# Patient Record
Sex: MALE | Race: WHITE
[De-identification: names, ages, dates, MRNs, and addresses within clinical notes are randomized per-mention and may not be internally consistent; named-entity substitution may affect disease eponyms.]

---

## 2017-10-27 NOTE — RAD
TWO VIEWS CHEST:

 

10/27/2017

 

COMPARISON:

06/16/2017

 

FINDINGS:

Two views of the chest demonstrate bilateral pleural effusions and/or pleural scar.  No significant 
interval changes seen since the previous comparison exam from 06/16/2017.  No other evidence of

air space opacities seen.

 

IMPRESSION:

Stable views chest with bilateral pleural effusions or pleural scar.  No significant interval change
s noted.

 

POS: [facility]

## 2017-10-27 NOTE — XMS
Clinical Summary

 Created on:2017



Patient:Teddy Moseley

Sex:Male

:1935

External Reference #:[id number]





Demographics







 Address  [address] [place] [address]

 

 Home Phone  [phone]

 

 Preferred Language  Unknown

 

 Marital Status  Unknown

 

 Faith Affiliation  Unknown

 

 Race  Unknown

 

 Ethnic Group  Unknown









Author







 Organization  [facility]

 

 Address  [address] [place] [address]

 

 Phone  [phone]









Care Team Providers







 Name  Role  Phone

 

 ,  Primary Care Provider  Unavailable









Allergies

Not on File



Current Medications

Not on file



Active Problems

Not on file



Social History







 Tobacco Use  Types  Packs/Day  Years Used  Date

 

 Never Assessed        









 Sex Assigned at Birth  Date Recorded

 

 Not on file  







Last Filed Vital Signs

Not on file



Plan of Treatment

Not on file



Results

Not on filefrom Last 3 Months

## 2017-11-12 ENCOUNTER — HOSPITAL ENCOUNTER (INPATIENT)
Dept: HOSPITAL 92 - ERS | Age: 82
LOS: 5 days | Discharge: NURSING, REHAB OR LONG-TERM CARE | DRG: 469 | End: 2017-11-17
Attending: INTERNAL MEDICINE | Admitting: INTERNAL MEDICINE
Payer: MEDICARE

## 2017-11-12 VITALS — BODY MASS INDEX: 20.1 KG/M2

## 2017-11-12 DIAGNOSIS — G89.11: ICD-10-CM

## 2017-11-12 DIAGNOSIS — E78.5: ICD-10-CM

## 2017-11-12 DIAGNOSIS — N17.9: ICD-10-CM

## 2017-11-12 DIAGNOSIS — N40.1: ICD-10-CM

## 2017-11-12 DIAGNOSIS — I48.0: ICD-10-CM
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
## 2017-11-12 DIAGNOSIS — Z91.81: ICD-10-CM

## 2017-11-12 DIAGNOSIS — I25.2: ICD-10-CM

## 2017-11-12 DIAGNOSIS — J43.1: ICD-10-CM

## 2017-11-12 DIAGNOSIS — J90: ICD-10-CM

## 2017-11-12 DIAGNOSIS — R33.8: ICD-10-CM

## 2017-11-12 DIAGNOSIS — Z99.81: ICD-10-CM

## 2017-11-12 DIAGNOSIS — S42.031A: ICD-10-CM

## 2017-11-12 DIAGNOSIS — Z79.52: ICD-10-CM

## 2017-11-12 DIAGNOSIS — I10: ICD-10-CM

## 2017-11-12 DIAGNOSIS — G62.9: ICD-10-CM

## 2017-11-12 DIAGNOSIS — I25.10: ICD-10-CM

## 2017-11-12 DIAGNOSIS — I24.8: ICD-10-CM

## 2017-11-12 DIAGNOSIS — S72.011A: Primary | ICD-10-CM

## 2017-11-12 DIAGNOSIS — F17.220: ICD-10-CM

## 2017-11-12 DIAGNOSIS — I73.9: ICD-10-CM

## 2017-11-12 DIAGNOSIS — D50.0: ICD-10-CM

## 2017-11-12 DIAGNOSIS — J96.21: ICD-10-CM

## 2017-11-12 LAB
ALP SERPL-CCNC: 81 U/L (ref 40–150)
ALT SERPL W P-5'-P-CCNC: 29 U/L (ref 8–55)
ANION GAP SERPL CALC-SCNC: 12 MMOL/L (ref 10–20)
ANISOCYTOSIS BLD QL SMEAR: (no result) (100X)
APTT PPP: 25.7 SEC (ref 22.9–36.1)
AST SERPL-CCNC: 16 U/L (ref 5–34)
BILIRUB SERPL-MCNC: 1.4 MG/DL (ref 0.2–1.2)
BUN SERPL-MCNC: 21 MG/DL (ref 8.4–25.7)
CALCIUM SERPL-MCNC: 8.7 MG/DL (ref 7.8–10.44)
CHLORIDE SERPL-SCNC: 104 MMOL/L (ref 98–107)
CO2 SERPL-SCNC: 32 MMOL/L (ref 23–31)
CREAT CL PREDICTED SERPL C-G-VRATE: 0 ML/MIN (ref 70–130)
GLOBULIN SER CALC-MCNC: 2.6 G/DL (ref 2.4–3.5)
HCT VFR BLD CALC: 39.9 % (ref 42–52)
MACROCYTES BLD QL SMEAR: (no result) (100X)
MAGNESIUM SERPL-MCNC: 2.4 MG/DL (ref 1.6–2.6)
OVALOCYTES BLD QL SMEAR: (no result) (100X)
POLYCHROMASIA BLD QL SMEAR: (no result) (100X)
PROTHROMBIN TIME: 12.9 SEC (ref 12–14.7)
RBC # BLD AUTO: 3.9 MILL/UL (ref 4.7–6.1)
SCHISTOCYTES BLD QL SMEAR: (no result) (100X)
TROPONIN I SERPL DL<=0.01 NG/ML-MCNC: 0.02 NG/ML (ref ?–0.03)
TROPONIN I SERPL DL<=0.01 NG/ML-MCNC: 0.04 NG/ML (ref ?–0.03)
WBC # BLD AUTO: 32.9 THOU/UL (ref 4.8–10.8)

## 2017-11-12 PROCEDURE — 85730 THROMBOPLASTIN TIME PARTIAL: CPT

## 2017-11-12 PROCEDURE — 85007 BL SMEAR W/DIFF WBC COUNT: CPT

## 2017-11-12 PROCEDURE — 85610 PROTHROMBIN TIME: CPT

## 2017-11-12 PROCEDURE — 82553 CREATINE MB FRACTION: CPT

## 2017-11-12 PROCEDURE — 94003 VENT MGMT INPAT SUBQ DAY: CPT

## 2017-11-12 PROCEDURE — 80053 COMPREHEN METABOLIC PANEL: CPT

## 2017-11-12 PROCEDURE — 86900 BLOOD TYPING SEROLOGIC ABO: CPT

## 2017-11-12 PROCEDURE — 99406 BEHAV CHNG SMOKING 3-10 MIN: CPT

## 2017-11-12 PROCEDURE — 71010: CPT

## 2017-11-12 PROCEDURE — 86850 RBC ANTIBODY SCREEN: CPT

## 2017-11-12 PROCEDURE — 82805 BLOOD GASES W/O2 SATURATION: CPT

## 2017-11-12 PROCEDURE — 93005 ELECTROCARDIOGRAM TRACING: CPT

## 2017-11-12 PROCEDURE — 96375 TX/PRO/DX INJ NEW DRUG ADDON: CPT

## 2017-11-12 PROCEDURE — 94640 AIRWAY INHALATION TREATMENT: CPT

## 2017-11-12 PROCEDURE — A4216 STERILE WATER/SALINE, 10 ML: HCPCS

## 2017-11-12 PROCEDURE — 85027 COMPLETE CBC AUTOMATED: CPT

## 2017-11-12 PROCEDURE — P9016 RBC LEUKOCYTES REDUCED: HCPCS

## 2017-11-12 PROCEDURE — 36430 TRANSFUSION BLD/BLD COMPNT: CPT

## 2017-11-12 PROCEDURE — 84484 ASSAY OF TROPONIN QUANT: CPT

## 2017-11-12 PROCEDURE — 94760 N-INVAS EAR/PLS OXIMETRY 1: CPT

## 2017-11-12 PROCEDURE — G0390 TRAUMA RESPONS W/HOSP CRITI: HCPCS

## 2017-11-12 PROCEDURE — 36415 COLL VENOUS BLD VENIPUNCTURE: CPT

## 2017-11-12 PROCEDURE — 94002 VENT MGMT INPAT INIT DAY: CPT

## 2017-11-12 PROCEDURE — 83735 ASSAY OF MAGNESIUM: CPT

## 2017-11-12 PROCEDURE — S0028 INJECTION, FAMOTIDINE, 20 MG: HCPCS

## 2017-11-12 PROCEDURE — 96361 HYDRATE IV INFUSION ADD-ON: CPT

## 2017-11-12 PROCEDURE — 80048 BASIC METABOLIC PNL TOTAL CA: CPT

## 2017-11-12 PROCEDURE — 85025 COMPLETE CBC W/AUTO DIFF WBC: CPT

## 2017-11-12 PROCEDURE — 96365 THER/PROPH/DIAG IV INF INIT: CPT

## 2017-11-12 PROCEDURE — 84100 ASSAY OF PHOSPHORUS: CPT

## 2017-11-12 PROCEDURE — 86901 BLOOD TYPING SEROLOGIC RH(D): CPT

## 2017-11-12 RX ADMIN — MORPHINE SULFATE PRN MG: 4 INJECTION, SOLUTION INTRAMUSCULAR; INTRAVENOUS at 23:52

## 2017-11-12 NOTE — CON
DATE OF ADMISSION:  2017

 

DATE OF CONSULTATION:  2017

 

ATTENDING PHYSICIAN:  Dr. Lucas Flores.

 

HISTORY OF PRESENT ILLNESS:  Teddy Moseley is an 82-year-old gentleman with past medical history
 of COPD, atrial fibrillation, recurrent pleural effusion, who presented to [facility]
m status post fall.  He had a chief complaint of right lower extremity pain.  He was evaluated in [facility] emergency room and found to be in atrial fibrillation with RVR with a right clavicle fracture, a r
ight pleural effusion and a right hip fracture.  Given patient's severe pulmonary history and multip
le medical problems, Orthopedic Surgery asked that Medicine admit the patient with trauma and Orthop
edics as consultants.  Upon my evaluation, the patient reports a chief complaint of right hip pain. 
 GCS of 15.  The patient states that he was walking in his kitchen when his legs gave out from under
neath him.  He denied any dizziness, syncope, loss of consciousness, head trauma.  He states he land
ed on his right side, right hip, and right shoulder simultaneously.

 

ALLERGIES:  None.

 

HOME MEDICATIONS:  Ultram 50 mg 1 tab p.o. b.i.d. p.r.n. for pain, temazepam 30 mg p.o. at bedtime, 
gabapentin 600 mg t.i.d., prednisone 10 mg p.o. daily, although the patient is currently on a predni
sone taper, Flomax 0.4 mg p.o. daily, atorvastatin 40 mg p.o. at bedtime, and aspirin 81 mg p.o. tash
ly.

 

PAST MEDICAL HISTORY:  Significant for COPD on 2 liters home O2, atrial fibrillation, recurrent pleu
ral effusion, neuropathy, hypertension, BPH, coronary artery disease and peripheral vascular disease
.

 

PAST SURGICAL HISTORY:  Appendectomy, right lung decortication, carotid endarterectomy.

 

SOCIAL HISTORY:  The patient lives at home with wife and son.  Denies alcohol use.  Denies illicit d
rug use.  The patient is a former smoker, approximately 50-pack years, chews tobacco.

 

FAMILY HISTORY:  Significant for father  from a heart attack in his 70s.  Mother  fr
om cervical cancer in her 90s, brothers with diabetes.

 

REVIEW OF SYSTEMS:  Negative except as indicated in the HPI.

 

PHYSICAL EXAMINATION:

VITAL SIGNS:  Blood pressure 118/76, pulse 101, respirations 19, O2 sat 97% on 3-4 liters nasal arabella
timothy.

GENERAL:  Well-developed elderly appearing male, in no acute distress, resting in bed.

PULMONARY:  Normal work of breathing, symmetric chest rise.  Lung sounds are distant bilaterally wit
h mild expiratory wheezing.  There appears to be clubbing of the digits.

CARDIOVASCULAR:  Tachycardic, irregularly irregular.  No obvious murmurs, rubs or gallops.

GASTROINTESTINAL:  Abdomen is mildly rotund and tympanic.  No tenderness to palpation.  Bowel sounds
 are positive.

BACK:  Back exam is being reported within normal limits.

MUSCULOSKELETAL:  Right upper extremity range of motion limited secondary to pain.  There is tendern
ess to the right shoulder.  There is a skin tear on the right wrist that is currently bandaged.  Hamilton
ssing is clean, dry, and intact.  Right lower extremity mildly shortened and externally rotated.  Th
ere is left lower extremity range of motion within normal limits.  There is 1+ pitting edema at the 
ankle.  Pulses are intact bilaterally.

NEUROLOGIC:  GCS of 15.  No focal deficit noted.

 

LABORATORY FINDINGS:  WBC 32.9, hemoglobin 12.4, hematocrit 39.9, platelet count 252, 17% bands.  IN
R 1.0.  Sodium 144, potassium 4.4, chloride 104, carbon dioxide 32, BUN 21, creatinine 0.95, total b
ilirubin 1.4, AST and ALT within normal limits.

 

RADIOGRAPHIC FINDINGS:  X-ray of the right shoulder significant for clavicular fracture, official re
ad is pending.  Hip x-ray is significant for right femoral neck fracture, official read is pending. 
 Chest x-ray demonstrates hyperinflated lungs with bibasilar pleural effusion, right greater than le
ft, official read is still pending.  EKG is significant for atrial fibrillation with RVR and nonspec
ific T-wave abnormality as well as PVCs.

 

ASSESSMENT:

1.  Status post mechanical fall.

2.  Right hip fracture.

3.  Right clavicle fracture.

4.  Acute traumatic pain.

5.  Leukocytosis with bandemia.

6.  Anemia.

7.  Chronic obstructive pulmonary disease/chronic hypoxic respiratory failure on home O2.

8.  Bilateral effusion, right greater than left.

9.  History of coronary artery disease and hypertension.

10.  Chronic steroid use.

 

PLAN:  The patient to be admitted to Medicine Service and IMCU.  Orthopedic Surgery has seen and francia
luated the patient.  They plan for operative intervention tomorrow if cleared by Pulmonology and [facility]
pital Medicine.  Perioperative pain management.  PT, OT.  DVT and gastritis prophylaxis as appropria
te.  The patient should be n.p.o. after midnight.  Trauma attending has been notified of consultatio
n.  Plans for admission were discussed with the patient as the patient was being evaluated concurren
tly with Hospital Medicine.  All questions were answered at the time of this dictation.

## 2017-11-12 NOTE — RAD
TWO VIEWS RIGHT HIP:  

 

History: Fall, pain. 

 

Comparison: None. 

 

FINDINGS: 

An impacted slightly displaced fracture involving the right hip which appears to be subcapital in lo
cation. There is disruption of the cortical margin noted along the superior lateral aspect. 

 

Moderate degenerative changes of the right hip joint space. Visualized bony pelvis is unremarkable. 

 

IMPRESSION: 

Right hip (subcapital) fracture. 

 

POS: [facility]

## 2017-11-12 NOTE — RAD
THREE VIEWS RIGHT SHOULDER:

 

History: Fall, pain. 

 

Comparison: None. 

 

FINDINGS: 

Mild bone demineralization. Glenohumeral joint space is preserved. No fracture or dislocation with r
egards to the proximal humerus. 

 

Scapula is intact. Visualized right ribs are also intact. There is a mildly displaced distal clavicl
e fracture. 

 

IMPRESSION: 

Mildly displaced distal clavicle fracture. 

 

POS: [facility]

## 2017-11-12 NOTE — RAD
ONE VIEW CHEST:

 

Comparison: 10-27-17

 

History: Fall. Pain. 

 

FINDINGS: 

Chronic changes of the right hemithorax. Stable cardiac silhouette. Stable compensatory hyperinflati
on of the left lung. Stable bilateral apical pleural thickening. There is no pneumothorax. 

 

Distal right clavicle fracture. 

 

Acute fractures along the posterior right ribs are not appreciated. There appear to be remote fractu
res involving the posterior right 8th and 9th rib. 

 

IMPRESSION: 

1. Acute right clavicle fracture. 

2. Remote fractures along the posterior right 8th and 9th rib. 

3. Chronic changes of the lung parenchyma as above. 

 

POS: [facility]

## 2017-11-12 NOTE — CON
DATE OF CONSULTATION:  11/12/2017

 

PRINCIPAL DIAGNOSES:

1.  Femoral neck fracture.

2.  Clavicle fracture.

 

HISTORY OF PRESENT ILLNESS:  Mr. Moseley is an 82-year-old gentleman who suffers frequent falls at
 home.  He fell today and suffered a fracture of his distal clavicle and moderately displaced femora
l neck fracture on the right side, both fracture being on the right side.  He complains of pain at t
he hip and the shoulder.

 

PHYSICAL EXAMINATION:

GENERAL:  Shows a pleasant gentleman who is alert and oriented.  He has remarkable sense of humor an
d very good spirits.

HEAD:  Normocephalic, atraumatic.  His clavicle is tender.

EXTREMITIES:  His right hip has mild shortening and external rotation with tenderness with any manip
ulation of the hip.  No palpable pulses distally.  His capillary refill is intact, and he has sensat
ion and motor function distally on the right leg.  Neurovascular status is intact and right upper ex
tremity with intact radial pulse.

 

Radiographs show a type 2 distal clavicle fracture and a displaced femoral neck fracture.

 

PLAN:  At this time is as follows:  Clavicle fracture will be treated conservatively.  He may be all
owed to bear a little bit of weight on this since this is type 2 fracture.  The conoid and trapezoid
 ligaments appeared to be intact.  The femoral neck, we treated with hemiarthroplasty.  He does have
 a significant history of COPD and Dr. Barillas will see him preoperatively.  Spinal anesthesia will be
 considered.

## 2017-11-12 NOTE — HP
DATE OF ADMISSION:  11/12/2017

 

PRIMARY CARE PHYSICIAN:  Out at [facility].

 

PRIMARY CARDIOLOGIST:  Dr. Toro Orr at [facility].

 

PRIMARY PULMONOLOGIST AND BASICALLY PRIMARY CARE PHYSICIAN:  Dr. Mitch Barillas here.

 

CHIEF COMPLAINT:  Fall with hip fracture.

 

HISTORY OF PRESENT ILLNESS:  Mr. Moseley is a very pleasant 82-year-old white male with history of
 peripheral neuropathy, coronary artery disease with remote MI, hypertension, BPH, and COPD.  This p
atient is fairly significant who was in normal state of health and fell this morning.

 

He states he was walking, had been up for a couple of minutes, walking across the house, and suddenl
y just found himself on the floor.  He does not remember any loss of consciousness or presyncope.  N
o dizziness.  Normally, his family is around and they are able to help him to the floor, but not thi
s time.  He fell on to his left side, had immediate right hip pain and right shoulder pain.  He was 
brought to the emergency department for evaluation.

 

He denies any head trauma.  No fevers or chills.  No nausea or vomiting.  No diarrhea or constipatio
n.  He has recently been treated for a COPD exacerbation and is tapering down off the steroids.

 

No other current complaints.  At present, he is comfortable.

 

The patient was being seen by Dr. Hassan and by the Trauma Service by Corrine prior to my arrival.  T
he plan is for us to admit him for Orthopedics and Trauma to consult.

 

PAST MEDICAL HISTORY:

1.  Peripheral neuropathy.

2.  Coronary artery disease, status post MI in 2001.

3.  Benign prostatic hypertrophy.

4.  Hypertension.

5.  Asthma/COPD.

6.  Recurrent right pleural effusion that has not required drainage in some time.

 

PAST SURGICAL HISTORY:  Includes:

1.  Carotid endarterectomy.  He knows he has had the one done on the left and next he may have the o
ne done on the right as well.

2.  Appendectomy remotely.

3.  Right lung pleural effusion removal, multiple times.  At one time, he did have what sounds like 
a PleurX catheter in place, but that at some point fell out and has not needed re-draining since.

 

HOME MEDICATIONS:

1.  Aspirin 81 mg daily.

2.  Gabapentin 600 mg p.o. t.i.d.

3.  Tramadol 50 mg p.o. b.i.d.

4.  Tamsulosin 0.4 mg p.o. daily.

5.  Prednisone 10 mg p.o. daily.  He is currently on a tapering dose down to 20 mg daily.  He is sup
posed to go back down to 10 mg tomorrow.

6.  DuoNebs nebulized t.i.d.

7.  Budesonide 500 mg nebulized b.i.d.

8.  Temazepam 15-30 mg p.o. at bedtime.

9.  Vitamin D3 1000 units daily.

10.  Atorvastatin 40 mg p.o. at bedtime.

 

ALLERGIES:  NKDA.

 

FAMILY HISTORY:  Negative for clotting or bleeding disorder, no immune dysfunction.

 

SOCIAL HISTORY:  Significant for chewing tobacco.  He has backed off quite some time in the last cou
ple of weeks.  He did have a [id number] history, started at the age of 17 or 18, 1 pack per day fo
r about 60 years, but quit that a few years ago and started chewing tobacco.

 

No history of alcohol or IV drug use.  He lives with his family.

 

REVIEW OF SYSTEMS:  A 10-point review of systems was performed, negative for all other systems excep
t stated as per HPI.  Specifically, he states that his breathing is about baseline.  He denies any p
alpitations.

 

PHYSICAL EXAMINATION:

VITAL SIGNS:  Temperature currently 99.0, pulse anywhere from 107-149, blood pressure 148/79, respir
atory rate 20, satting 96% on 2 liters.

GENERAL:  He is awake.  He is alert.  He is oriented x3.  He is an older white male who is frail but
 appears to be well-developed, well-nourished.

HEENT:  Normocephalic, atraumatic.  Pupils equal, round, reactive bilaterally.  Mucous membranes are
 moist.  He has no visible lesion, no thrush.  His teeth are in poor repair.

NECK:  Supple.  He has bilateral carotid endarterectomy scars.  He has no bruits.  Normal carotid up
strokes.

NECK:  Supple with good range of motion.  No thyromegaly.

LUNGS:  He has prolonged expiratory phase and expiratory high-pitched wheezing.  He has got poor air
 movement bilaterally with symmetrical chest excursion.  He has no crackles, no rhonchi.

CARDIOVASCULAR:  He is irregularly irregular.  He has an S1 and a faint S2.  He has no audible murmu
rs.

ABDOMEN:  Soft.  It is nontender, nondistended, no masses, no organomegaly.

EXTREMITIES:  Show no cyanosis or clubbing.  He has no edema at present.

MUSCULOSKELETAL:  He has swelling and fluid around the medial right clavicle.  His left hip is nonte
nder and without any palpable joint effusions.  The remainder of his joints are normal to inspection
.

NEUROLOGIC:  Cranial nerves II through XII are grossly intact.  He has no focal neurologic deficits 
in 4-5/5 strength in all 4 of his extremities.

SKIN:  Otherwise, warm, moist, and well perfused.  He has no rashes, no lesions.  He has an IV in robby
th of his peripheral forearms.

 

LABORATORY DATA:  CMP is essentially normal.  Sodium 144, potassium 4.4, creatinine 0.95, glucose 89
.  INR is 1.0.

 

Troponin I was 0.022, MB fraction of 1.7.  He has a white count of 32,900,he has 75% granulocytes, w
ith 7% bands.  Hemoglobin is 12.4, hematocrit of 39.9, platelets 252,000.

 

IMAGING:  Right shoulder films showed a right clavicular fracture medial with minimal displacement. 
 Chest x-ray showed no acute lung abnormalities.  Right hip showed a comminuted right femoral neck f
racture.

 

ASSESSMENT AND PLAN:

1.  Fall from same level.  The patient does not report and specifically denies syncope, presyncope, 
or palpitations.  He does have a history of recurrent falls.  Placed him on fall precautions.

2.  Right femoral neck fracture:  Certainly, a traumatic fracture with associated right medial clavi
cular fracture.  Orthopedic has been consulted.  We will immobilize the right arm.  We will keep the
 patient n.p.o. as Dr. Hassan has seen the patient and plan is to take him to the operating room [facility] if cleared by Pulmonary for operative repair.

3.  Atrial fibrillation with rapid ventricular response, history of paroxysmal atrial fibrillation: 
 Per Dr. Gallegos's request, we will place the patient on amiodarone drip without a loading dose.  We
 are getting 1 mg per hour.  We will reevaluate in the morning.  The heart rate is fluctuating betwe
en 107-170 on the monitor, may benefit from the Cardizem as his blood pressure support or digoxin.

4.  History of coronary artery disease:  No chest pain.  He has a heart doctor that he sees in [place].  We will monitor his biomarkers.

5.  Benign prostatic hypertrophy.  We will continue his Flomax.

6.  Hypertension, on really nothing.

7.  Hyperlipidemia, on atorvastatin.

8.  History of chronic obstructive pulmonary disease:  Does not appear to be in any acute flare.  We
 will continue DuoNebs q.i.d. with q. 2 hours p.r.n. albuterol.  We will continue his steroids at 20
 mg a day.  I will likely restart his budesonide as well.  Pulmonary has been consulted.  We will ge
t their recommendations.

8.  Recurrent right pleural effusion:  Not much seen on chest x-ray today other than scarring and pl
eural thickening.  We will follow up on the official report once available.

## 2017-11-13 LAB
ANION GAP SERPL CALC-SCNC: 9 MMOL/L (ref 10–20)
BASOPHILS # BLD AUTO: 0 THOU/UL (ref 0–0.2)
BASOPHILS NFR BLD AUTO: 0.2 % (ref 0–1)
BUN SERPL-MCNC: 26 MG/DL (ref 8.4–25.7)
CALCIUM SERPL-MCNC: 7.8 MG/DL (ref 7.8–10.44)
CHLORIDE SERPL-SCNC: 105 MMOL/L (ref 98–107)
CO2 SERPL-SCNC: 31 MMOL/L (ref 23–31)
CREAT CL PREDICTED SERPL C-G-VRATE: 40 ML/MIN (ref 70–130)
EOSINOPHIL # BLD AUTO: 0.5 THOU/UL (ref 0–0.7)
EOSINOPHIL NFR BLD AUTO: 2.7 % (ref 0–10)
HCT VFR BLD CALC: 33.4 % (ref 42–52)
LYMPHOCYTES # BLD: 1.5 THOU/UL (ref 1.2–3.4)
LYMPHOCYTES NFR BLD AUTO: 8.5 % (ref 21–51)
MECHANICAL TIDAL VOLUME: 500 ML
MODIFIED ALLEN'S TEST: (no result)
MONOCYTES # BLD AUTO: 1.4 THOU/UL (ref 0.11–0.59)
MONOCYTES NFR BLD AUTO: 7.6 % (ref 0–10)
NEUTROPHILS # BLD AUTO: 14.8 THOU/UL (ref 1.4–6.5)
RBC # BLD AUTO: 3.24 MILL/UL (ref 4.7–6.1)
SODIUM SERPL-SCNC: 138 MMOL/L (ref 135–148)
TROPONIN I SERPL DL<=0.01 NG/ML-MCNC: 0.04 NG/ML (ref ?–0.03)
VENT: YES
WBC # BLD AUTO: 18.2 THOU/UL (ref 4.8–10.8)

## 2017-11-13 PROCEDURE — 5A1945Z RESPIRATORY VENTILATION, 24-96 CONSECUTIVE HOURS: ICD-10-PCS | Performed by: INTERNAL MEDICINE

## 2017-11-13 PROCEDURE — 0SRR0JA REPLACEMENT OF RIGHT HIP JOINT, FEMORAL SURFACE WITH SYNTHETIC SUBSTITUTE, UNCEMENTED, OPEN APPROACH: ICD-10-PCS | Performed by: ORTHOPAEDIC SURGERY

## 2017-11-13 RX ADMIN — FAMOTIDINE SCH MG: 10 INJECTION, SOLUTION INTRAVENOUS at 21:40

## 2017-11-13 RX ADMIN — AMIODARONE HYDROCHLORIDE SCH MLS: 50 INJECTION, SOLUTION INTRAVENOUS at 22:17

## 2017-11-13 RX ADMIN — CEFEPIME HYDROCHLORIDE SCH MLS: 1 INJECTION, POWDER, FOR SOLUTION INTRAMUSCULAR; INTRAVENOUS at 18:00

## 2017-11-13 RX ADMIN — FAMOTIDINE SCH MG: 10 INJECTION, SOLUTION INTRAVENOUS at 08:41

## 2017-11-13 RX ADMIN — Medication SCH GM: at 18:19

## 2017-11-13 RX ADMIN — AMIODARONE HYDROCHLORIDE SCH MLS: 50 INJECTION, SOLUTION INTRAVENOUS at 05:13

## 2017-11-13 RX ADMIN — Medication SCH GM: at 22:14

## 2017-11-13 RX ADMIN — MORPHINE SULFATE PRN MG: 4 INJECTION, SOLUTION INTRAMUSCULAR; INTRAVENOUS at 09:19

## 2017-11-13 RX ADMIN — MORPHINE SULFATE PRN MG: 4 INJECTION, SOLUTION INTRAMUSCULAR; INTRAVENOUS at 05:13

## 2017-11-13 NOTE — PRG
DATE OF SERVICE:  11/13/2017

 

Mr. Moseley is seen with the Trauma Service and Corrine Perez PA-C.  Please see her note for deta
ils:  Mr. Moseley has no complaints.  He is going to the OR today for open reduction and internal 
fixation of his hip fracture.

 

ASSESSMENT:   History of ground level fall and right hip fracture, set for repair today.  

 

He has a history of multiple chronic medical problems including chronic obstructive pulmonary diseas
e, history of atrial fibrillation, history of hypertension.

 

PLAN:  Appreciate Medicine's assistance with his care.  Plans per Ortho.  Trauma will continue to fo
llow.

## 2017-11-13 NOTE — PDOC.PN
- Subjective


Encounter Start Date: 11/13/17


Encounter Start Time: 09:00





Pt seen and exmained, wife at the bedisde.  Pt had had Afib/flutter with HR 

between 70 and 110 all morning.  No CP, no SOb, breathing at baeline.  NO f/C, 

no D/C.  trouble urinating today.  Did get flomax last evening





Case discussed face to face with Dr Barillas, Dr Hassan and Corrine ([facility]) with 

trauma.  Pt probably as medically stable as he gets, still high risk, but risk 

minimized





Troponins up a little overnight, likely demand ischemia form his 120-170 HR on 

admit





Hip hurts, cant move it due to pain





10 point ROS performed and neg for all systems except as per HPI





- Objective


Resuscitation Status: 


 











Resuscitation Status           DNR:Do Not Resuscitate














MAR Reviewed: Yes


Vital Signs & Weight: 


 Vital Signs (12 hours)











  Temp Pulse Resp BP BP Pulse Ox


 


 11/13/17 07:38  98.7 F  70  20    98


 


 11/13/17 07:31   70  20    98


 


 11/13/17 07:17  98.7 F  61  16  103/41 L   100


 


 11/13/17 05:00    14   103/47 L  100


 


 11/13/17 04:00   78  14   94/48 L  100


 


 11/13/17 03:00   80  14   90/40 L  100


 


 11/13/17 02:30   84  14   97/59 L  100


 


 11/13/17 02:00   82  14   97/39 L  100


 


 11/13/17 01:30   84  16   100/54 L 


 


 11/13/17 01:00   105 H  16   104/46 L  100


 


 11/13/17 00:32   61  18    98


 


 11/13/17 00:30   82  16   107/50 L  100


 


 11/13/17 00:00   84  16   113/63  100


 


 11/12/17 23:30   82  16   107/60  100








 Weight











Weight                         157 lb














I&O: 


 











 11/12/17 11/13/17 11/14/17





 06:59 06:59 06:59


 


Intake Total  1121 


 


Balance  1121 











Result Diagrams: 


 11/13/17 03:44





 11/13/17 03:44


Radiology Reviewed by me: Yes


EKG Reviewed by me: Yes





Phys Exam





- Physical Examination


Constitutional: NAD


HEENT: PERRLA, moist MMs, sclera anicteric, oral pharynx no lesions


Neck: no nodes, no JVD, supple, full ROM


Respiratory: no wheezing, no rhonchi


coarse R sided breath sounds.  poor air movemment


no prolonged expiration or wheezing this AM


irreg irreg.  no audible murmurs at present


Gastrointestinal: soft, non-tender, no distention, positive bowel sounds


Musculoskeletal: no edema, pulses present


Neurological: non-focal, normal sensation, moves all 4 limbs


decreased RLE movement due ot pain.  foot flexion normal


Lymphatic: no nodes


Psychiatric: normal affect, A&O x 3


Skin: no rash, normal turgor, cap refill <2 seconds


Deviation from normal: hematoma over medial right clavicle stable





Dx/Plan


(1) Fall on same level as cause of accidental injury


Code(s): W18.30XA - FALL ON SAME LEVEL, UNSPECIFIED, INITIAL ENCOUNTER   Status

: Acute   





(2) Fracture of femoral neck, right, closed


Code(s): S72.001A - FRACTURE OF UNSP PART OF NECK OF RIGHT FEMUR, INIT   Status

: Acute   Comment: to OR today 11/13 for ORIF with Dr Hassan.  Medically stable

   





(3) Right clavicle fracture


Code(s): S42.001A - FRACTURE OF UNSP PART OF RIGHT CLAVICLE, INIT FOR CLOS FX   

Status: Acute   


Qualifiers: 


   Encounter type: initial encounter   Clavicle location: sternal end   

Fracture type: closed   Fracture alignment: nondisplaced   Qualified Code(s): 

S42.017A - Nondisplaced fracture of sternal end of right clavicle, initial 

encounter for closed fracture   


Comment: minimal displacement   





(4) COPD (chronic obstructive pulmonary disease)


Status: Acute   


Qualifiers: 


   COPD type: emphysema   Emphysema type: panlobular   Qualified Code(s): J43.1 

- Panlobular emphysema   


Comment: on scheduled duonebs, oral steroids, PRN albuterol.  Pulm on the case, 

discusse dith Dr Barillas his [facility] Lung specialist   





(5) Paroxysmal atrial fibrillation with RVR


Code(s): I48.0 - PAROXYSMAL ATRIAL FIBRILLATION   Status: Chronic   Comment: in 

Afib/flutter on the monitor.  Rate better controlled.  On amio gtt.  continue 

for now.  Looks like he may be tryin gto organize and convert.  Not an 

anticoagulation candidate due to falls   





(6) Urinary retention


Code(s): R33.9 - RETENTION OF URINE, UNSPECIFIED   Status: Acute   Comment: 

acute, history of BPH. bladder scan and starks is more than 300   





(7) GERTRUDE (acute kidney injury)


Code(s): N17.9 - ACUTE KIDNEY FAILURE, UNSPECIFIED   Status: Acute   Comment: 

Dr Osborn to 1.49.  ? ATN from decreased output with RVR vs lower obstruction.  

follow   





(8) CAD (coronary artery disease)


Code(s): I25.10 - ATHSCL HEART DISEASE OF NATIVE CORONARY ARTERY W/O ANG PCTRS 

  Status: Acute   


Qualifiers: 


   Coronary Disease-Associated Artery/Lesion type: native artery   Native vs. 

transplanted heart: native heart   Associated angina: without angina   

Qualified Code(s): I25.10 - Atherosclerotic heart disease of native coronary 

artery without angina pectoris   





(9) BPH with obstruction/lower urinary tract symptoms


Code(s): N40.1 - BENIGN PROSTATIC HYPERPLASIA WITH LOWER URINARY TRACT SYMP; 

N13.8 - OTHER OBSTRUCTIVE AND REFLUX UROPATHY   Status: Chronic   Comment: 

continue flomax, starks to be placed perioperatively   





(10) HTN (hypertension)


Code(s): I10 - ESSENTIAL (PRIMARY) HYPERTENSION   Status: Chronic   


Qualifiers: 


   Hypertension type: essential hypertension   Qualified Code(s): I10 - 

Essential (primary) hypertension   





(11) Peripheral neuropathy


Code(s): G62.9 - POLYNEUROPATHY, UNSPECIFIED   Status: Chronic   


Qualifiers: 


   Peripheral neuropathy type: polyneuropathy, unspecified   Qualified Code(s): 

G62.9 - Polyneuropathy, unspecified   





(12) Demand ischemia


Code(s): I24.8 - OTHER FORMS OF ACUTE ISCHEMIC HEART DISEASE   Status: Acute   

Comment: barely elevated troponin, no CP.  form increased HR with RVR.  rate 

better controlled   





- Plan





* .

## 2017-11-13 NOTE — OP
DATE OF PROCEDURE:  11/13/2017

 

PREOPERATIVE DIAGNOSIS:  Right hip displaced femoral neck fracture.

 

POSTOPERATIVE DIAGNOSIS:  Right hip displaced femoral neck fracture.

 

OPERATIVE PROCEDURE:  Press-Fit right hip monopolar hemiarthroplasty.

 

SURGEON:  Robert Hassan M.D.

 

ASSISTANT:  Brett Martini PA-C.

 

ANESTHESIA:  General via endotracheal tube.

 

COMPONENTS USED:  [employer] Accolade press fit size 3.5 with a 54 mm Uni_____ prosthesis w
ith -4 neck length.

 

ESTIMATED BLOOD LOSS:  150 mL

 

FINDINGS:  Femoral neck fracture as described on radiographs.

 

DRAINS:  None.

 

SPECIMENS:  None.

 

COMPLICATIONS:  None.

 

COUNTS:  Correct.

 

INDICATIONS FOR SURGERY:  Teddy is an 82-year-old white male who fell yesterday resulting in right
 hip femoral neck fracture.  He has been admitted to the medicine service and our service was consul
nicki for definitive orthopedic management of his right hip.

 

PROCEDURE IN DETAIL:  After informed consent was obtained in the preoperative holding area, the maame
ent received preoperative antibiotics.  He was taken to the operative suite and general anesthesia w
as induced and an endotracheal tube was placed and secured.  Once adequate anesthesia was obtained h
e was positioned appropriately  on the operating table in the left lateral decubitus.  The right low
er extremity was then prepped and draped in usual sterile fashion.  After the _____ was prepared, ti
me out was called prior to incision and all members of the surgical team agreed on the site, surgeon
, and patient.  Once this was completed, a linear incision was made using an anterolateral approach 
over the right greater trochanter.  Subcutaneous layers were dissected with Bovie electrocautery.  W
e encountered a large hematoma.  This was evacuated.  IT band was then dissected out and incised sha
rply.  Charnley retractor was placed under the IT band.  The abductors were then incised with Bovie 
electrocautery and reflected down to the neck.  Copious _____ was performed revealing a large hemato
ma and fracture pattern.  Provisional neck cut was made using the oscillating saw.  We then removed 
the femoral head with the corkscrew and the remaining debris was rongeured out.  Ensuring that the a
cetabulum was clear, pulsatile lavage was carried out in the depth of the acetabulum.  Attention was
 then turned to prep femoral preparation.  Reaming was carried up to size 5 and we are sequential br
oaching up to size 3.5.  This held firm and fast with antirotation and no subsidence.  The permanent
 prosthesis was then malleted firmly and squarely into place.  The endoprosthesis was trialed with memo neff shuck and stability.  Therefore we elected to go ahead and Egan taper the endoprosthesis into p
lace.  Reduction maneuver was performed.  Primary closure was accomplished with #2 and the abductors
 #2 and the IT band with a running Quill #2 and 0 Monocryl in the subcutaneous layer and Quill stitc
h was used to reapproximate the skin and skin glue was then used over the top.  A sterile dressing w
as applied.  The procedure was terminated without any complication.  The patient was sent to the Int
ensive Care Unit with an endotracheal tube in place and secured where he will be weaned off the vent
ilator.

## 2017-11-13 NOTE — CON
DATE OF CONSULTATION:  11/13/2017

 

HISTORY:  This is an 82-year-old gentleman who sees Dr. Barillas regularly.  His wife was here at the [facility].  He apparently fell and fractured his right hip, he was taken to surgery by Dr. Hassan, he h
as left on the vent overnight.  His chest x-ray shows chronic right pleural scarring, thickening.  JOSEPH ladd had previous thoracentesis and chest tube on the same side.

 

He has longstanding history of COPD.

 

Apparently he got weak this morning _____ on the floor.

 

Extensive history as outlined in several different records is pertinent for his prolonged chest tube
 following decortication in the right side.

 

PAST MEDICAL HISTORY:  Coronary artery disease, BPH, hypertension, COPD, neuropathy, deconditioning.

 

PAST SURGICAL HISTORY:  Carotid endarterectomy, appendix, decortication.

 

MEDICATIONS:  From home includes a recent prednisone, tramadol, temazepam, Crestor, Protonix, DuoNeb
, gabapentin, folic acid, Coreg, _____, aspirin, and Tylenol.

 

ALLERGIES:  None.

 

REVIEW OF SYSTEMS:  Negative.

 

PHYSICAL EXAMINATION:

VITAL SIGNS:  Blood pressure 110/56, pulse 89, temperature 99.

CHEST:  Reveals decreased breath sounds without any wheezing.

CARDIAC:  Normal S1, S2.  No gallops.

ABDOMEN:  Soft.  No masses.

 

LABORATORY:  White count 18,000, H\T\H 10 and 30, platelet 178.  White count on admission was 32,000
.  His chemistry showed creatinine of 1.46.

 

X-ray is noted.

 

IMPRESSION:

1.  Status post fall, fractured hip, status post surgery.

2.  Severe chronic obstructive pulmonary disease.

3.  Supraventricular tachycardia.

4.  Neuropathy.

 

PLAN:  Continue vent support overnight.  Continue IV Medrol, wean when stable.

 

Pain relief.

 

Dr. Barillas will see the patient in the morning.

## 2017-11-13 NOTE — PRG
DATE OF SERVICE:  11/13/2017

 

SUBJECTIVE:  Teddy Moseley is an 82-year-old gentleman who is hospital day 2, postop day 0, stat
us post ground level fall.  He was found to have a right hip fracture.  He is scheduled for operativ
e intervention to his injury later this afternoon pending pulmonary and medical clearance.  This a.m
., patient has a chief complaint of right hip pain and requested to see the x-rays of his hip fractu
re.

 

Atrial fibrillation is now rate controlled.

 

OBJECTIVE:

VITAL SIGNS:  Temperature 98.7, pulse 70, respirations 20, O2 sat 98% on 2 liters, blood pressure 12
2/58.

GENERAL:  Elderly thin and somewhat frail appearing male resting in bed in no acute distress.  Basel
ine tremor noted.

Pulmonary:  Normal work of breathing.  Symmetrical rise.

CARDIOVASCULAR:  Irregularly irregular.  No obvious murmurs, rubs or gallops.

GASTROINTESTINAL:  Abdomen is soft, nontender, and nondistended.

MUSCULOSKELETAL:  Limited right lower extremity and right upper extremity range of motion secondary 
to pain.

SKIN:  There is a hematoma over his right clavicle.

NEUROLOGIC:  No focal deficit noted.

 

LABORATORY DATA:  WBC 18.2, hemoglobin 10.6, hematocrit 33.4, and platelet count 178.  Sodium 141, p
otassium 4.2, chloride 105, carbon dioxide 31, BUN 26, creatinine 1.46, glucose 89.  Troponin 0.042.

 

ASSESSMENT:

1.  Status post ground level fall.

2.  Right hip fracture.

3.  Right clavicle fracture.

4.  Acute traumatic pain.

5.  Elevated troponin.

6.  Chronic obstructive pulmonary disease, chronic hypoxic respiratory failure on home O2.

7.  Urinary retention.

8.  Acute kidney injury.

9.  History of benign prostatic hypertrophy.

10.  History of hypertension.

11.  Atrial fibrillation, rate controlled.

 

PLAN:  The patient is awaiting clearance from Pulmonary for surgery this afternoon for his right hip
 fracture.  Patient should have sling for comfort to right upper extremity.  The patient is currentl
y n.p.o.  We will evaluate postoperative and manage postoperative pain.  Case management for eventua
l disposition.  Follow Pulmonary and Medicine recommendations regarding comorbidities.  Patient is c
urrently attempting to avoid RN to a bladder scan and place a Sandoval if residual or if bladder scan i
s greater than 300 per hospital medicine  A.m. labs.  Patient seen by trauma attending.  All questio
ns are answered at the time of this dictation.

## 2017-11-13 NOTE — RAD
RIGHT HIP TWO VIEWS:

 

Indication: Post op evaluation of the right hip.  

 

FINDINGS: 

Right hip prosthesis without evidence of hardware complication. Expected post procedural soft tissue
 findings are seen. 

 

IMPRESSION: 

Post-operative right hip. 

 

POS: RENATO

## 2017-11-14 LAB
ACANTHOCYTES BLD QL SMEAR: (no result) (100X)
ANION GAP SERPL CALC-SCNC: 11 MMOL/L (ref 10–20)
ANISOCYTOSIS BLD QL SMEAR: (no result) (100X)
BUN SERPL-MCNC: 38 MG/DL (ref 8.4–25.7)
CALCIUM SERPL-MCNC: 8 MG/DL (ref 7.8–10.44)
CHLORIDE SERPL-SCNC: 105 MMOL/L (ref 98–107)
CO2 SERPL-SCNC: 26 MMOL/L (ref 23–31)
CREAT CL PREDICTED SERPL C-G-VRATE: 40 ML/MIN (ref 70–130)
HCT VFR BLD CALC: 28.5 % (ref 42–52)
MAGNESIUM SERPL-MCNC: 2 MG/DL (ref 1.6–2.6)
RBC # BLD AUTO: 2.69 MILL/UL (ref 4.7–6.1)
SCHISTOCYTES BLD QL SMEAR: (no result) (100X)
WBC # BLD AUTO: 20.7 THOU/UL (ref 4.8–10.8)

## 2017-11-14 RX ADMIN — CEFEPIME HYDROCHLORIDE SCH MLS: 1 INJECTION, POWDER, FOR SOLUTION INTRAMUSCULAR; INTRAVENOUS at 15:33

## 2017-11-14 RX ADMIN — AMIODARONE HYDROCHLORIDE SCH MLS: 50 INJECTION, SOLUTION INTRAVENOUS at 14:29

## 2017-11-14 RX ADMIN — Medication SCH GM: at 06:07

## 2017-11-14 RX ADMIN — CEFEPIME HYDROCHLORIDE SCH MLS: 1 INJECTION, POWDER, FOR SOLUTION INTRAMUSCULAR; INTRAVENOUS at 04:18

## 2017-11-14 RX ADMIN — ACETAMINOPHEN AND CODEINE PHOSPHATE PRN TAB: 300; 30 TABLET ORAL at 20:30

## 2017-11-14 NOTE — PRG
DATE OF SERVICE:  11/14/2017

 

SUBJECTIVE:  The patient's hospital day #3, postop day #1, status post ground level fall when he nina
tained a right hip fracture.  The patient had multiple comorbidities to include significant COPD, ch
ronic hypoxic respiratory failure on home O2.  The patient underwent his orthopedic procedure yester
day and tolerated this procedure well.  The patient was left on the ventilator overnight.  This morn
ing will be evaluated by Dr. Barillas, his primary pulmonologist to evaluate potential for extubation. 
 Per report, the patient had no issues overnight.  The patient's vital signs were stable.

 

OBJECTIVE:

VITAL SIGNS:  This morning temperature is 99.1, heart rate 100, blood pressure 177/65, respirations 
20, oxygen saturation is 100% on the ventilator at 40% oxygen.

GENERAL:  The patient is resting in bed.  He is on sedation vacation this morning.  He will open his
 eyes and will follow some simple commands.

LUNGS:  His chest has scattered wheezing and rhonchi bilaterally.

HEART:  Irregularly irregular.

ABDOMEN:  Soft, flat, nontender with hypoactive bowel sounds.  Pelvis is stable.

EXTREMITIES:  Neurovascularly intact x4.  His surgical dressing is clean, dry, and intact.

 

LABORATORY DATA:  White blood cell count 20.7, hemoglobin 8.6, hematocrit 28.5, platelet 162.  Sodiu
m 138, potassium 4.4, chloride 105, CO2 of 26, BUN 38, creatinine 1.47, glucose 187, magnesium 2.0, 
phosphorus 3.9.

 

ASSESSMENT AND PLAN:

1.  Status post ground level fall.

2.  Right hip fracture, status post open reduction and internal fixation of hip fracture, right clav
icle fracture treated with sling.

3.  Respiratory failure secondary to significant chronic obstructive pulmonary disease, which is jae
ng followed by Dr. Barillas.  Plan will be to continue supportive care.  The patient's traumatic injuri
es have been addressed, and he is primarily going to have potential problems with his medical issues
, at which time we will allow the medicine team and specifically Dr. Barillas manage his care.  We may 
be consulted as needed.  Orthopedics will continue to follow along per their norm postoperatively.  
This was discussed with Dr. Barillas, and he was in agreement with this plan.

## 2017-11-14 NOTE — RAD
SEMIUPRIGHT CHEST 1 VIEW:

 

Date:  11/14/17 

 

HISTORY:  

82-year-old male with respiratory insufficiency. 

 

COMPARISON:  

11/12/17. 

 

FINDINGS:

Endotracheal tube in satisfactory location. Monitor leads overlie the chest. Prominent right-sided p
leural opacity with minimal parenchymal opacity and some fairly prominent right-sided lung volume lo
ss. Left apical pleural thickening. Healed right rib fractures. Loop recorder overlying the left rose
st. 

 

IMPRESSION: 

Stable right-sided lung volume loss with extensive pleural and some parenchymal opacity changes. Sta
ble left chest. Endotracheal tube in satisfactory location. 

 

 

POS: RENATO

## 2017-11-14 NOTE — PDOC.PN
- Subjective


Encounter Start Date: 11/14/17


Encounter Start Time: 16:45


Subjective: f/u s/p ORIF R hip fx post fall and extubated. No new complaints.


-: Tele showing A-fib controlled rate on Amiodarone gtt. No CP or 


-: worsening SOB.





- Objective


Resuscitation Status: 


 











Resuscitation Status           DNR:Do Not Resuscitate














MAR Reviewed: Yes


Vital Signs & Weight: 


 Vital Signs (12 hours)











  Temp Pulse Resp BP Pulse Ox


 


 11/14/17 16:00  98.2 F    


 


 11/14/17 13:10   85  15   99


 


 11/14/17 12:00  97.7 F     93 L


 


 11/14/17 09:36   94  23 H   99


 


 11/14/17 08:00    15  


 


 11/14/17 07:24  99.1 F  99  21 H   93 L


 


 11/14/17 07:00  99.1 F    


 


 11/14/17 06:58   97   134/46 L 


 


 11/14/17 06:52   101 H  22 H   99


 


 11/14/17 06:00    17  








 Weight











Admit Weight                   157 lb


 


Weight                         157 lb











 Most Recent Monitor Data











Heart Rate from ECG            75


 


NIBP                           143/60


 


NIBP BP-Mean                   95


 


Respiration from ECG           14


 


SpO2                           97














I&O: 


 











 11/13/17 11/14/17 11/15/17





 06:59 06:59 06:59


 


Intake Total 1121 252.8 240


 


Output Total  1050 370


 


Balance 1121 -797.2 -130











Result Diagrams: 


 11/14/17 04:53





 11/14/17 04:53


Additional Labs: 





 Laboratory Tests











  11/12/17 11/12/17 11/13/17





  18:28 18:28 03:44


 


WBC  32.9 H  


 


Hgb  12.4 L  


 


Creatinine   0.95  1.46 H














  11/13/17





  03:44


 


WBC  18.2 H


 


Hgb  10.6 L


 


Creatinine 











Radiology Reviewed by me: Yes (PCXR - chronic changes bilat, R distal clavicle 

fx)


EKG Reviewed by me: Yes (Tele - A-fib in 80's on Amiodarone gtt)





Phys Exam





- Physical Examination


Constitutional: NAD


HEENT: PERRLA, oral pharynx no lesions


Neck: no JVD, supple


diminished in bases


Cardiovascular: irregular


Gastrointestinal: soft, non-tender, no distention, positive bowel sounds


R hip with edema and surgical dressing in place


Musculoskeletal: pulses present


Neurological: normal sensation, moves all 4 limbs


Psychiatric: A&O x 3


Skin: normal turgor, cap refill <2 seconds





Dx/Plan


(1) Fracture of femoral neck, right, closed


Code(s): S72.001A - FRACTURE OF UNSP PART OF NECK OF RIGHT FEMUR, INIT   Status

: Acute   Comment: to OR today 11/13 for ORIF with Dr Hassan.  Medically stable

   





(2) GERTRUDE (acute kidney injury)


Code(s): N17.9 - ACUTE KIDNEY FAILURE, UNSPECIFIED   Status: Acute   Comment: 

Dr Osborn to 1.49.  ? ATN from decreased output with RVR, supportive care, avoid 

nephrotoxic meds and contrast media, repeat creat in am   





(3) CAD (coronary artery disease)


Code(s): I25.10 - ATHSCL HEART DISEASE OF NATIVE CORONARY ARTERY W/O ANG PCTRS 

  Status: Chronic   


Qualifiers: 


   Coronary Disease-Associated Artery/Lesion type: native artery   Native vs. 

transplanted heart: native heart   Associated angina: without angina   

Qualified Code(s): I25.10 - Atherosclerotic heart disease of native coronary 

artery without angina pectoris   


Comment: Stable,    





(4) COPD (chronic obstructive pulmonary disease)


Status: Acute   


Qualifiers: 


   COPD type: emphysema   Emphysema type: panlobular   Qualified Code(s): J43.1 

- Panlobular emphysema   


Comment: on scheduled duonebs, oral steroids, PRN albuterol.  Pulm on the case, 

[facility], continue Cefepime, 

Solumedrol, Duonebs   





(5) Demand ischemia


Code(s): I24.8 - OTHER FORMS OF ACUTE ISCHEMIC HEART DISEASE   Status: Acute   

Comment: barely elevated troponin, no CP.  form increased HR with RVR.  rate 

better controlled   





(6) Right clavicle fracture


Code(s): S42.001A - FRACTURE OF UNSP PART OF RIGHT CLAVICLE, INIT FOR CLOS FX   

Status: Acute   


Qualifiers: 


   Encounter type: initial encounter   Clavicle location: sternal end   

Fracture type: closed   Fracture alignment: nondisplaced   Qualified Code(s): 

S42.017A - Nondisplaced fracture of sternal end of right clavicle, initial 

encounter for closed fracture   


Comment: minimal displacement, shoulder sling   





(7) Paroxysmal atrial fibrillation with RVR


Code(s): I48.0 - PAROXYSMAL ATRIAL FIBRILLATION   Status: Chronic   Comment: in 

Afib/flutter on the monitor.  Rate better controlled.  On amio gtt.  continue 

for now.  Looks like he may be tryin gto organize and convert.  Not an 

anticoagulation candidate due to falls   





- Plan


plan discussed w/ family, continue antibiotics, PT/OT, , 

respiratory therapy, DVT proph w/SCDs


Stable currently


-: Continue bronchodilators


-: Continue Solumedrol and Cefepime


-: PT/OT for mobilization


-: Continue Amiodarone gtt





* Pain control


* AM lab: BMP, CBC


* Likely transition to tele in 24h

## 2017-11-14 NOTE — PRG
DATE OF SERVICE:  11/14/2017

 

SUBJECTIVE:  Mr. Teddy Moseley was left intubated overnight.  He is awake and alert this morning
.

 

OBJECTIVE:

VITAL SIGNS:  His heart rate is 85, respiratory rate is 15, oximetry is 99% and blood pressure 137/5
0.

LUNGS:  Equal breath sounds.  His lungs are clear.

HEART:  Regular rhythm.

ABDOMEN:  Soft.

 

LABORATORY DATA:  White count 20, hemoglobin 8.6 and platelets 162,000.  Electrolytes were normal.  
Creatinine is 1.47.  His baseline on admission was 0.95.  PH 7.42, CO2 of 30 and pO2 of 116 yesterda
y after surgery.

 

IMPRESSION:

1.  Chronic obstructive pulmonary disease with overnight ventilation after surgery.

2.  Status post right hip hemiarthroplasty after a fall.

3.  Underlying obstructive lung disease.

4.  History of clavicle fracture with this fall.

5.  History of an indwelling chest tube for a chronic empyema, eventually fell out after he had slow
ly been advanced out.  He has had no recurrence of symptoms in this area.

6.  Chronic respiratory failure, chronically on oxygen at home.

 

PLAN:  Continue supportive care.  I felt he is a candidate for extubation.  This has been done succe
ssfully.  He has no complaints.  He is back to his baseline entertaining personality.

## 2017-11-15 LAB
ANION GAP SERPL CALC-SCNC: 10 MMOL/L (ref 10–20)
BUN SERPL-MCNC: 54 MG/DL (ref 8.4–25.7)
CALCIUM SERPL-MCNC: 8.3 MG/DL (ref 7.8–10.44)
CHLORIDE SERPL-SCNC: 103 MMOL/L (ref 98–107)
CO2 SERPL-SCNC: 28 MMOL/L (ref 23–31)
CREAT CL PREDICTED SERPL C-G-VRATE: 35 ML/MIN (ref 70–130)
HCT VFR BLD CALC: 22.2 % (ref 42–52)
RBC # BLD AUTO: 2.19 MILL/UL (ref 4.7–6.1)
WBC # BLD AUTO: 19.7 THOU/UL (ref 4.8–10.8)

## 2017-11-15 PROCEDURE — 30233N1 TRANSFUSION OF NONAUTOLOGOUS RED BLOOD CELLS INTO PERIPHERAL VEIN, PERCUTANEOUS APPROACH: ICD-10-PCS | Performed by: INTERNAL MEDICINE

## 2017-11-15 RX ADMIN — ACETAMINOPHEN AND CODEINE PHOSPHATE PRN TAB: 300; 30 TABLET ORAL at 17:08

## 2017-11-15 RX ADMIN — CEFEPIME HYDROCHLORIDE SCH MLS: 1 INJECTION, POWDER, FOR SOLUTION INTRAMUSCULAR; INTRAVENOUS at 03:19

## 2017-11-15 RX ADMIN — ACETAMINOPHEN AND CODEINE PHOSPHATE PRN TAB: 300; 30 TABLET ORAL at 08:27

## 2017-11-15 RX ADMIN — ACETAMINOPHEN AND CODEINE PHOSPHATE PRN TAB: 300; 30 TABLET ORAL at 03:16

## 2017-11-15 RX ADMIN — CEFEPIME HYDROCHLORIDE SCH MLS: 1 INJECTION, POWDER, FOR SOLUTION INTRAMUSCULAR; INTRAVENOUS at 16:09

## 2017-11-15 RX ADMIN — AMIODARONE HYDROCHLORIDE SCH MLS: 50 INJECTION, SOLUTION INTRAVENOUS at 05:13

## 2017-11-15 RX ADMIN — AMIODARONE HYDROCHLORIDE SCH MLS: 50 INJECTION, SOLUTION INTRAVENOUS at 19:34

## 2017-11-15 NOTE — CON
DATE OF CONSULTATION:  11/15/2017

 

REASON FOR CONSULTATION:  Atrial fibrillation.

 

REFERRING PROVIDER:  Mitch Barillas M.D.

 

HISTORY OF PRESENT ILLNESS:  Mr. Moseley is a pleasant 82-year-old gentleman, who has been seen and
 evaluated by Cardiology in [place], [place].  He recently presented with a fractured hip and clavicl
e.  He underwent successful surgery on 11/13/2017.  He developed atrial fibrillation in addition to a
nemia.  He has no current symptoms.  No chest pain, pressure, shortness of breath or other associated
 symptoms.

 

PAST MEDICAL HISTORY:  MI 25 years ago, CAD, BPH, hypertension, and COPD.

 

PAST SURGICAL HISTORY:  Carotid endarterectomy, decortication of his lung.

 

HOME MEDICATIONS:  Include Crestor, Protonix, DuoNeb, gabapentin, folic acid, Coreg, aspirin, and Tyl
enol.

 

ALLERGIES:  None.

 

REVIEW OF SYSTEMS:  Ten-point review of systems is reviewed and is as above negative.

 

PHYSICAL EXAMINATION:

GENERAL:  Patient is a pleasant male who is in no acute distress.  The patient appears his stated age
.

VITAL SIGNS:  Blood pressure 110/70, pulse 80, respiratory rate 20, and heart rate 72.

NEUROLOGIC:  The patient is alert and oriented times 3 with no focal neurologic deficits.

HEENT:  Sclerae without icterus.  Mouth has moist mucous membranes with normal pallor.

NECK:  No JVD.  Carotid upstroke brisk.  No bruits bilaterally.

LUNGS:  Clear to auscultation with unlabored respirations.

BACK:  No scoliosis or kyphosis.

CARDIAC:  Regular rate and rhythm with extra systolic beats.

ABDOMEN:  Soft, nontender, nondistended.  No peritoneal signs present.  No hepatosplenomegaly.  No ab
normal striae.

EXTREMITIES:  2+ femoral and 2+ dorsalis pedis pulses.  No cyanosis, clubbing, or edema.

SKIN:  No gross abnormalities.

 

LABORATORY DATA:  Hemoglobin 7.1.  Creatinine 1.66.

 

IMPRESSION:

1.  Atrial fibrillation with rapid ventricular response, now sinus with PAC.

2.  Coronary artery disease.

3.  Anemia.

4.  Recent hip fracture.

 

RECOMMENDATIONS:  Unknown whether atrial fibrillation is due to a recent demise with a hip fracture a
nemia versus intermittent paroxysmal atrial fibrillation.  At this point, he continues to be in sinus
 rhythm.  We will continue amiodarone drip at this point.  He does have a history of CAD and may be a
 candidate for Multaq versus amiodarone therapy.  The other option includes a 3-week event recorder a
nd reassess in 1 month.  Echo with Doppler also recommended.  Otherwise, I have no recommendations.

## 2017-11-15 NOTE — PDOC.PN
- Subjective


Encounter Start Date: 11/15/17


Encounter Start Time: 13:15


Subjective: f/u for R hip fx s/p ORIF POD #1. A-fib RVR on Amiodarone now rate


-: controlled. Receiving 2u PRBC's currently. No new complaints.





- Objective


Resuscitation Status: 


 











Resuscitation Status           DNR:Do Not Resuscitate














MAR Reviewed: Yes


Vital Signs & Weight: 


 Vital Signs (12 hours)











  Temp Pulse Resp Pulse Ox


 


 11/15/17 13:18   72  24 H  98


 


 11/15/17 12:00  98.5 F   


 


 11/15/17 07:22  97.7 F  65  17  97


 


 11/15/17 07:04     99


 


 11/15/17 07:01   63  26 H  99


 


 11/15/17 07:00  97.7 F   


 


 11/15/17 04:00  98.1 F   


 


 11/15/17 03:16   79  16  96








 Weight











Admit Weight                   157 lb


 


Weight                         157 lb











 Most Recent Monitor Data











Heart Rate from ECG            69


 


NIBP                           120/49


 


NIBP BP-Mean                   74


 


Respiration from ECG           15


 


SpO2                           100














I&O: 


 











 11/14/17 11/15/17 11/16/17





 06:59 06:59 06:59


 


Intake Total 252.8 2615 590


 


Output Total 1050 775 260


 


Balance -797.2 1840 330











Result Diagrams: 


 11/15/17 04:22





 11/15/17 04:22


Additional Labs: 





 Laboratory Tests











  11/12/17 11/12/17 11/13/17





  18:28 18:28 03:44


 


WBC  32.9 H  


 


Hgb  12.4 L  


 


Creatinine   0.95  1.46 H














  11/13/17 11/14/17 11/14/17





  03:44 04:53 04:53


 


WBC  18.2 H   20.7 H


 


Hgb  10.6 L   8.6 L


 


Creatinine   1.47 H 











Radiology Reviewed by me: Yes (PCXR - no acute infiltrates)


EKG Reviewed by me: Yes (Tele - A-fib in 70's)





Phys Exam





- Physical Examination


Constitutional: NAD


HEENT: PERRLA, oral pharynx no lesions


Neck: no JVD, supple


wheezing noted


Cardiovascular: irregular


Gastrointestinal: soft, non-tender, no distention, positive bowel sounds


R hip with edema and surgical dressing in place


Musculoskeletal: pulses present


Neurological: normal sensation, moves all 4 limbs


Psychiatric: A&O x 3


Skin: normal turgor, cap refill <2 seconds





Dx/Plan


(1) Fracture of femoral neck, right, closed


Code(s): S72.001A - FRACTURE OF UNSP PART OF NECK OF RIGHT FEMUR, INIT   Status

: Acute   Comment: s/p R hip ORIF POD#1, pain control, DVT ppx, rehab screening

   





(2) GERTRUDE (acute kidney injury)


Code(s): N17.9 - ACUTE KIDNEY FAILURE, UNSPECIFIED   Status: Acute   Comment: 

Persistent, IVF's, avoid nephrotoxic meds, limit NSAIDs   





(3) CAD (coronary artery disease)


Code(s): I25.10 - ATHSCL HEART DISEASE OF NATIVE CORONARY ARTERY W/O ANG PCTRS 

  Status: Chronic   


Qualifiers: 


   Coronary Disease-Associated Artery/Lesion type: native artery   Native vs. 

transplanted heart: native heart   Associated angina: without angina   

Qualified Code(s): I25.10 - Atherosclerotic heart disease of native coronary 

artery without angina pectoris   


Comment: Stable,    





(4) COPD (chronic obstructive pulmonary disease)


Status: Acute   


Qualifiers: 


   COPD type: emphysema   Emphysema type: panlobular   Qualified Code(s): J43.1 

- Panlobular emphysema   


Comment: on scheduled duonebs, oral steroids, PRN albuterol.  Pulm on the case, 

discusse dith Dr Barillas his [facility] Lung specialist, continue Cefepime, 

Solumedrol, Duonebs   





(5) Demand ischemia


Code(s): I24.8 - OTHER FORMS OF ACUTE ISCHEMIC HEART DISEASE   Status: Acute   

Comment: barely elevated troponin, no CP.  form increased HR with RVR.  rate 

better controlled   





(6) Right clavicle fracture


Code(s): S42.001A - FRACTURE OF UNSP PART OF RIGHT CLAVICLE, INIT FOR CLOS FX   

Status: Acute   


Qualifiers: 


   Encounter type: initial encounter   Clavicle location: sternal end   

Fracture type: closed   Fracture alignment: nondisplaced   Qualified Code(s): 

S42.017A - Nondisplaced fracture of sternal end of right clavicle, initial 

encounter for closed fracture   


Comment: minimal displacement, shoulder sling   





(7) Paroxysmal atrial fibrillation with RVR


Code(s): I48.0 - PAROXYSMAL ATRIAL FIBRILLATION   Status: Chronic   Comment: in 

Afib/flutter on the monitor.  Rate better controlled.  On amio gtt.  continue 

for now, Cardiology consult, no anticoagulation due to falls and acute anemia   





- Plan


PT/OT, , respiratory therapy, out of bed/ambulate, DVT proph w/

SCDs


Stable overall


-: Continue Amiodarone gtt for rate control


-: Transfuse 2u PRBC's today


-: Rehab screen


-: Transfer to Tele





* AM labs: BMP, CBC

## 2017-11-15 NOTE — RAD
PORTABLE CHEST:

 

History: Respiratory distress. 

 

Comparison: Prior day's exam. 

 

FINDINGS: 

Endotracheal tube has been removed. Heart size is within normal limits. Pleural and parenchymal lung 
changes are stable. 

 

IMPRESSION: 

Interval removal of the endotracheal tube, otherwise stable exam. 

 

POS: [facility]

## 2017-11-15 NOTE — PRG
DATE OF SERVICE:  11/15/2017

 

SUBJECTIVE:  Mr. Moseley is doing reasonably well.  He has no complaints surprisingly of his clavic
le fracture.  He said he could not shoot a deer rifle, but other than that he is doing fine.

 

OBJECTIVE:

VITAL SIGNS:  He is afebrile.  Heart rate 64, respiratory rate is 20, oximetry is 98%  on 2 liters an
d blood pressure 125/57.

LUNGS:  Free of wheezes.

HEART:  Regular rhythm.

ABDOMEN:  Soft and nontender.

EXTREMITIES:  His feet are warm.

 

IMPRESSION:

1.  Status post surgical repair.

2.  Chronic atrial fibrillation.

3.  Chronic obstructive pulmonary disease.

4.  History of an empyema with a chronic indwelling chest tube.

 

PLAN:  Continue with therapy, wound, respiratory therapy.  Transfuse 2 units of packed cells today.  
Given his multiple medical problems, I do not think he will tolerate ongoing significant anemia.

## 2017-11-16 LAB
ANION GAP SERPL CALC-SCNC: 9 MMOL/L (ref 10–20)
BUN SERPL-MCNC: 44 MG/DL (ref 8.4–25.7)
CALCIUM SERPL-MCNC: 8.4 MG/DL (ref 7.8–10.44)
CHLORIDE SERPL-SCNC: 104 MMOL/L (ref 98–107)
CO2 SERPL-SCNC: 30 MMOL/L (ref 23–31)
CREAT CL PREDICTED SERPL C-G-VRATE: 53 ML/MIN (ref 70–130)
HCT VFR BLD CALC: 26.9 % (ref 42–52)
RBC # BLD AUTO: 2.77 MILL/UL (ref 4.7–6.1)
WBC # BLD AUTO: 15.6 THOU/UL (ref 4.8–10.8)

## 2017-11-16 RX ADMIN — AMIODARONE HYDROCHLORIDE SCH MLS: 50 INJECTION, SOLUTION INTRAVENOUS at 11:57

## 2017-11-16 RX ADMIN — ACETAMINOPHEN AND CODEINE PHOSPHATE PRN TAB: 300; 30 TABLET ORAL at 09:16

## 2017-11-16 RX ADMIN — ACETAMINOPHEN AND CODEINE PHOSPHATE PRN TAB: 300; 30 TABLET ORAL at 01:28

## 2017-11-16 RX ADMIN — CEFEPIME HYDROCHLORIDE SCH MLS: 1 INJECTION, POWDER, FOR SOLUTION INTRAMUSCULAR; INTRAVENOUS at 15:48

## 2017-11-16 RX ADMIN — ACETAMINOPHEN AND CODEINE PHOSPHATE PRN TAB: 300; 30 TABLET ORAL at 20:16

## 2017-11-16 RX ADMIN — CEFEPIME HYDROCHLORIDE SCH MLS: 1 INJECTION, POWDER, FOR SOLUTION INTRAMUSCULAR; INTRAVENOUS at 03:47

## 2017-11-16 NOTE — PRG
DATE OF SERVICE:  11/16/2017

 

Mr. Moseley is still on IV amiodarone.  

 

PHYSICAL EXAMINATION: 

VITAL SIGNS:  His heart rate is 62, respiratory rate is 19, oximetry is 96 on 2 liters, blood pressur
e 131/51.  

LUNGS:  Lungs are clear.  

HEART:  Regular rhythm.

ABDOMEN:  Abdomen is soft.  

EXTREMITIES:  His feet are warm.

 

LABORATORY:  White count is 15.6, hemoglobin 8.6, platelets 150,000.  Sodium 138, potassium 4.8, chlo
ride 104, bicarb 30, BUN 44, creatinine 1.11.

 

IMPRESSION:

1.  Status post multiple fractures after a fall.

2.  Blood loss anemia.  His hemoglobin was 8.6 today.

3.  Prerenal azotemia related to his blood loss, that has improved.

4.  Chronic obstructive pulmonary disease with chronic respiratory failure on oxygen.

5.  Atrial fibrillation which has been followed by a cardiologist in [place].  

 

PLAN:  He is stable to move out of the Critical Care Unit if Cardiology agrees.  Rehab placement will
 be the next step.

## 2017-11-16 NOTE — PDOC.PN
- Subjective


Encounter Start Date: 11/16/17


Encounter Start Time: 13:40


Subjective: f/u for A-fib RVR on previous Amiodarone now d/c'd. Feels better 

and 


-: breathing better. s/p 2u PRBC's with stable H/H. Minimal R hip pain and


-: working with PT for ambulation.





- Objective


Resuscitation Status: 


 











Resuscitation Status           DNR:Do Not Resuscitate














MAR Reviewed: Yes


Vital Signs & Weight: 


 Vital Signs (12 hours)











  Temp Pulse Resp Pulse Ox


 


 11/16/17 13:42   64  15  96


 


 11/16/17 12:00  97.9 F   


 


 11/16/17 08:00  97.8 F  57 L  19  97


 


 11/16/17 07:40   62  19  96


 


 11/16/17 04:00  98.2 F   








 Weight











Admit Weight                   157 lb


 


Weight                         157 lb











 Most Recent Monitor Data











Heart Rate from ECG            67


 


NIBP                           153/59


 


NIBP BP-Mean                   97


 


Respiration from ECG           23


 


SpO2                           94














I&O: 


 











 11/15/17 11/16/17 11/17/17





 06:59 06:59 06:59


 


Intake Total 2615 1881 350


 


Output Total 775 1600 440


 


Balance 1840 281 -90











Result Diagrams: 


 11/16/17 03:55





 11/16/17 03:55


Additional Labs: 





 Laboratory Tests











  11/12/17 11/12/17 11/13/17





  18:28 18:28 03:44


 


WBC  32.9 H  


 


Hgb  12.4 L  


 


Creatinine   0.95  1.46 H














  11/13/17 11/14/17 11/14/17





  03:44 04:53 04:53


 


WBC  18.2 H   20.7 H


 


Hgb  10.6 L   8.6 L


 


Creatinine   1.47 H 














  11/15/17 11/15/17





  04:22 04:22


 


WBC   19.7 H


 


Hgb   7.1 L


 


Creatinine  1.66 H 











Radiology Reviewed by me: Yes (PCXR - no acute findings)


EKG Reviewed by me: Yes (Tele - SR in 60's)





Phys Exam





- Physical Examination


Constitutional: NAD


alert, talkative


HEENT: PERRLA, oral pharynx no lesions


Neck: no JVD, supple


Respiratory: no wheezing, clear to auscultation bilateral


Cardiovascular: RRR


Gastrointestinal: soft, non-tender, no distention, positive bowel sounds


R hip with surgical dressing in place, + edema


Musculoskeletal: pulses present


Neurological: normal sensation, moves all 4 limbs


Psychiatric: A&O x 3


Skin: normal turgor, cap refill <2 seconds





Dx/Plan


(1) Fracture of femoral neck, right, closed


Code(s): S72.001A - FRACTURE OF UNSP PART OF NECK OF RIGHT FEMUR, INIT   Status

: Acute   Comment: s/p R hip ORIF POD#2, pain control, DVT ppx, rehab transfer 

when medicall stabilized   





(2) GERTRUDE (acute kidney injury)


Code(s): N17.9 - ACUTE KIDNEY FAILURE, UNSPECIFIED   Status: Acute   Comment: 

Resolved, avoid nephrotoxic meds, limit NSAIDs   





(3) CAD (coronary artery disease)


Code(s): I25.10 - ATHSCL HEART DISEASE OF NATIVE CORONARY ARTERY W/O ANG PCTRS 

  Status: Chronic   


Qualifiers: 


   Coronary Disease-Associated Artery/Lesion type: native artery   Native vs. 

transplanted heart: native heart   Associated angina: without angina   

Qualified Code(s): I25.10 - Atherosclerotic heart disease of native coronary 

artery without angina pectoris   


Comment: Stable,    





(4) COPD (chronic obstructive pulmonary disease)


Status: Acute   


Qualifiers: 


   COPD type: emphysema   Emphysema type: panlobular   Qualified Code(s): J43.1 

- Panlobular emphysema   


Comment: on scheduled duonebs, oral steroids, PRN albuterol.  Pulm on the case, 

discus[facility]ith [facility], continue Cefepime, 

Solumedrol, Duonebs   





(5) Demand ischemia


Code(s): I24.8 - OTHER FORMS OF ACUTE ISCHEMIC HEART DISEASE   Status: Acute   

Comment: barely elevated troponin, no CP.  form increased HR with RVR.  rate 

better controlled   





(6) Right clavicle fracture


Code(s): S42.001A - FRACTURE OF UNSP PART OF RIGHT CLAVICLE, INIT FOR CLOS FX   

Status: Acute   


Qualifiers: 


   Encounter type: initial encounter   Clavicle location: sternal end   

Fracture type: closed   Fracture alignment: nondisplaced   Qualified Code(s): 

S42.017A - Nondisplaced fracture of sternal end of right clavicle, initial 

encounter for closed fracture   


Comment: minimal displacement, shoulder sling prn, ROM exercises as tolerated   





(7) Paroxysmal atrial fibrillation with RVR


Code(s): I48.0 - PAROXYSMAL ATRIAL FIBRILLATION   Status: Chronic   Comment: 

Resolved, Amiodarone po, no anticoagulation due to falls and acute anemia   





- Plan


plan discussed w/ family, PT/OT, , respiratory therapy, out of 

bed/ambulate, DVT proph w/SCDs


Stable overall


-: Continue rate control with Amiodarone po


-: Follow H/H levels


-: Rehab transfer when medically optimized


-: Continue Coreg 3.125mg BID





* AM lab: BMP, CBC


* Transfer to telemetry unit

## 2017-11-16 NOTE — RAD
PORTABLE SEMIUPRIGHT FRONTAL CHEST RADIOGRAPH:

 

DATE: 11/16/17.

 

COMPARISON: 

11/15/17.

 

HISTORY: 

Ventilated patient.

 

FINDINGS: 

No endotracheal tube is present.  Extensive linear interstitial density noted with pulmonary hyperinf
lation, stable.  Loop recorder overlies the left hilar shadow, stable.

 

There is volume loss involving the right hemithorax with circumferential right pleural thickening, st
able.  Blunting of costophrenic angle again noted on the right.  There is an obliquely oriented mildl
y displaced distal right clavicle fracture.

 

IMPRESSION: 

Stable chronic findings as detailed above.

 

POS: [facility]

## 2017-11-16 NOTE — PRG
DATE OF SERVICE:  11/16/2017

 

SUBJECTIVE:  Mr. Moseley is doing better.  He is back in sinus rhythm with PACs.  He is currently o
n IV amiodarone.

 

PHYSICAL EXAMINATION: 

VITAL SIGNS:  Blood pressure 130/51, pulse 67, temperature 98.8.

LUNGS:  Clear to auscultation.

CARDIAC:  Regular rate and rhythm with extra systolic beats.

ABDOMEN:  Soft, nontender, nondistended.

EXTREMITIES:  No edema.

 

IMPRESSION:

1.  Atrial fibrillation, now sinus rhythm.

2.  Recent hip fracture.

3.  Coronary artery disease, status post myocardial infarction.

 

RECOMMENDATIONS:  It may be prudent to continue to watch Mr. Moseley on telemetry monitoring to ass
ess for any further episodes of atrial fibrillation.  This may have been precipitated by recent traum
a.  Will likely recommend close outpatient monitoring.  He has an implantable loop recorder which we 
can follow to assess for paroxysmal atrial fibrillation.  It would be okay from my standpoint to lim
sfer to telemetry.

## 2017-11-17 VITALS — DIASTOLIC BLOOD PRESSURE: 81 MMHG | TEMPERATURE: 97.9 F | SYSTOLIC BLOOD PRESSURE: 179 MMHG

## 2017-11-17 LAB
ANION GAP SERPL CALC-SCNC: 8 MMOL/L (ref 10–20)
ANISOCYTOSIS BLD QL SMEAR: (no result) (100X)
BUN SERPL-MCNC: 39 MG/DL (ref 8.4–25.7)
CALCIUM SERPL-MCNC: 8.1 MG/DL (ref 7.8–10.44)
CHLORIDE SERPL-SCNC: 105 MMOL/L (ref 98–107)
CO2 SERPL-SCNC: 30 MMOL/L (ref 23–31)
CREAT CL PREDICTED SERPL C-G-VRATE: 57 ML/MIN (ref 70–130)
HCT VFR BLD CALC: 25.7 % (ref 42–52)
MACROCYTES BLD QL SMEAR: (no result) (100X)
RBC # BLD AUTO: 2.64 MILL/UL (ref 4.7–6.1)
SCHISTOCYTES BLD QL SMEAR: (no result) (100X)
WBC # BLD AUTO: 12.8 THOU/UL (ref 4.8–10.8)

## 2017-11-17 PROCEDURE — 30233N1 TRANSFUSION OF NONAUTOLOGOUS RED BLOOD CELLS INTO PERIPHERAL VEIN, PERCUTANEOUS APPROACH: ICD-10-PCS | Performed by: INTERNAL MEDICINE

## 2017-11-17 RX ADMIN — ACETAMINOPHEN AND CODEINE PHOSPHATE PRN TAB: 300; 30 TABLET ORAL at 02:17

## 2017-11-17 RX ADMIN — ACETAMINOPHEN AND CODEINE PHOSPHATE PRN TAB: 300; 30 TABLET ORAL at 08:33

## 2017-11-17 RX ADMIN — ACETAMINOPHEN AND CODEINE PHOSPHATE PRN TAB: 300; 30 TABLET ORAL at 14:00

## 2017-11-17 RX ADMIN — CEFEPIME HYDROCHLORIDE SCH MLS: 1 INJECTION, POWDER, FOR SOLUTION INTRAMUSCULAR; INTRAVENOUS at 04:52

## 2017-11-17 NOTE — DIS
DATE OF ADMISSION:  11/12/2017

 

DATE OF DISCHARGE:  11/17/2017

 

DISCHARGE DIAGNOSES:

1.  Fall from same level as cause of accidental injury.

2.  Right displaced femoral neck fracture.

3.  Paroxysmal atrial fibrillation.

4.  Atrial fibrillation with rapid ventricular response.

5.  Chronic obstructive pulmonary disease with acute exacerbation.

6.  Acute on chronic hypoxemic respiratory failure.

7.  Right traumatic clavicular fracture, distal with minimal angulation and displacement.

8.  Benign prostatic hypertrophy.

9.  Urinary retention.

10.  Acute kidney injury.

11.  History of coronary artery disease without angina.

12.  Essential hypertension.

13.  Peripheral neuropathy.

14.  Demand ischemia.

 

CONSULTATIONS:

1.  Cardiology, Dr. Haque on 11/15/2017.

2.  Pulmonary and Critical Care, Dr. Crow per Dr. Barillas.

3.  Orthopedics, Dr. Robert Hassan.

4.  Trauma team by Dr. Corrine Perez.

 

PROCEDURES:  On 11/13/2017, monopolar hemiarthroplasty by Dr. Robert Hassan with assistant, Brett Martini
.

 

HISTORY AND PHYSICAL:  Mr. Moseley is a pleasant 82-year-old gentleman whom I admitted back on 11/1
2/2017 and has presented in the Emergency Department for a fall and hip pain.

 

Evaluation showed atrial fibrillation with rapid ventricular response with heart rate in the 140s to 
170s, right hip fracture, and the right clavicular fracture.  We were called for admission, Trauma wa
s consulted as well as orthopedic consultation was placed by the emergency room.

 

HOSPITAL COURSE:  The patient was seen by me in the emergency department.  He was admitted to the [facility] due to atrial fibrillation with RVR.  He was started on amiodarone per Dr. Gallegos's request, with 
improvement in his heart rate.  He had no chest pain.  Cardiac biomarkers are mildly elevated due to 
demand from increased heart rate and COPD was stable.  Continue on nebulizer treatments and started o
n IV steroids.  Continue Flomax for urinary retention.

 

Overnight on 11/12/2017 to 11/13/2017, the patient was retaining urine.  Sandoval catheter was placed in
 anticipation of surgery anyways and medically heart rate was under better control.  Labs remained ot
herwise stable.  He was taken to the operating room on 11/13/2017 by Dr. Hassan for hemiarthroplasty.
  Patient tolerated the procedure well with no known intraoperative complications.  The patient was s
een postoperatively in consultation by Pulmonary and Critical Care for his stay in the intermediate c
are unit, he was continued on vent support overnight on 11/13/2017 to 11/14/2017 continued on IV Medr
ol.

 

By 11/14/2017, the patient was taken to OR by Dr. Rubio for our service.  The patient has remained sta
ble.  Creatinine remained slightly elevated from his baseline, remained afebrile.  Cardiology was con
sulted and saw the patient on 11/15/2017.  Overnight on 11/14/2017 to 11/15/2017, patient remained st
able, but still in atrial fibrillation.  He was seen by Dr. Haque in 11/15/2017, but the patient 
had converted to sinus rhythm.  He is to continue on amiodarone and converted to p.o.  Recommended 2D
 echocardiogram which does not look like it has been ordered.

 

By 11/16/2017, the patient remained stable.  Creatinine down to 1.11.  Hemoglobin down to 8.6 and 
D remained stable.  He has been down onto the critical care unit to the floor and on overnight on 11/
16/2017 and 11/17/2017 remained without event.

 

Today, his hemoglobin was slightly down to 8.4.  Given his recent fibrillation with RVR, the patient 
was given 1 final unit of packed red blood cells.  He tolerated the transfusion well and post-procedu
re, was accepted and stable for transfer to inpatient rehabilitation for rehabilitation.

 

DISCHARGE PHYSICAL EXAMINATION:  The patient was seen and examined on the day of discharge.  Discharg
e plan and disposition were discussed with the patient face to face at the bedside.

 

DISCHARGE MEDICATIONS:

1.  Amiodarone 200 mg p.o. b.i.d., new medication.

2.  Tylenol No. 3 one to two p.o. q.6 hours p.r.n. moderate to severe pain.

3.  Albuterol 2.5 mg nebulizer every 2 hours as needed for shortness of breath.

4.  DuoNeb scheduled q.6 hours.

5.  Aspirin 81 mg daily.

6.  Lipitor 40 mg daily.

7.  Carvedilol 3.125 mg p.o. b.i.d.

8.  Iron sulfate 325 mg p.o. b.i.d.

9.  Folic acid 1 mg daily.

10.  Gabapentin 600 mg b.i.d.

11.  Protonix 40 mg p.o. q.a.m.

12.  Prednisone 10 mg daily.

13.  Flomax 0.4 mg p.o. at bedtime.

14.  Temazepam 50 mg p.o. at bedtime p.r.n. insomnia.

15.  Tramadol on hold.

 

FOLLOWUP APPOINTMENTS:

1.  Cardiology within a week.

2.  Primary care physician within a week.

3.  Dr. Barillas in 1-2 weeks.

4.  Follow up with his cardiologist as necessary.

 

DISCHARGE DIET:  Heart healthy.

 

DISCHARGE ACTIVITY:  Per cardiopulmonary and orthopedic limitations.

 

DISCHARGE THERAPIES:  Physical therapy and occupational therapy while at inpatient rehabilitation.

 

DISCHARGE CONDITION:  Stable.

 

DISPOSITION:  Being discharged to [facility] inpatient rehabilitation.

 

INSTRUCTIONS:  Return to Emergency Department for chest pain, difficulty breathing or new problems.

## 2017-11-17 NOTE — PDOC.CTH
Cardiology Progress Note





- Subjective





No new issues overnight. Remains in sinus rhythm with PACs. No CV complaints. 

Tolerating current medical therapy. ROS otherwise negative.





- Objective


 Vital Signs











  Temp Pulse Pulse Resp BP BP Pulse Ox


 


 11/17/17 11:21  98.3 F   60  18  160/74 H  


 


 11/17/17 11:06  98.2 F   60  16  158/70 H   94 L


 


 11/17/17 08:28        100


 


 11/17/17 08:26   68   16   


 


 11/17/17 07:54  98.7 F  53 L   16   133/63  99


 


 11/17/17 04:00  97.5 F L  90   20   115/59 L  98


 


 11/17/17 00:14   64   12   








 











Admit Weight                   157 lb


 


Weight                         165 lb 8 oz














 











 11/16/17 11/17/17 11/18/17





 06:59 06:59 06:59


 


Intake Total 1881 850 0


 


Output Total 1600 890 


 


Balance 281 -40 0














- Physical Examination


General/Neuro: alert & oriented x3, NAD


Neck: carotid US brisk, no JVD present


Lungs: CTA, unlabored respirations


Heart: PMI normal, RRR


Abdomen: no HSM, NT/ND, soft


Extremities: other: (2+ pulses, no edema)


Other PE findings: Neuro: no focal motor defs





- Telemetry


Telemetry Rhythm: sinus with PACs





- Labs


Result Diagrams: 


 11/17/17 04:36





 11/17/17 04:36


 Troponin/CKMB











CK-MB (CK-2)  1.7 ng/mL (0-6.6)   11/12/17  18:28    


 


Troponin I  0.042 ng/mL (< 0.028)  H  11/13/17  00:16    














- Assessment/Plan





1. PAF: sinus on amiodarone. Continue oral therapy. Follow up with Dr. Haque in week following discharge.





2. Hip fracture: stable.





3. COPD: stable currently.

## 2017-11-17 NOTE — PRG
DATE OF SERVICE:  11/17/2017

 

SUBJECTIVE:  Teddy Moseley has done well overnight.  He has no complaints.  He woke up little con
gested, but feels better after neb treatment.  Encouraged him to sit up in the chair more today, he w
alked twice yesterday.

 

PHYSICAL EXAMINATION:

VITAL SIGNS:  He is afebrile, heart rate 60, respiratory rate 16, oximetry is 100% on 2 liters, blood
 pressure 133/63.

LUNGS:  Clear.

HEART:  Irregular rhythm, rate controlled.

ABDOMEN:  Soft and nontender.

 

LABORATORY DATA:  White count is 12.8, hemoglobin is 8.4.

 

It would not be unreasonable to give him another unit of blood as I discussed with Dr. Cruz, the 
spitalist.  I do think he is stable to go over to rehabilitation soon.

 

His renal function is back to his baseline normal state.  Hopefully we can get him more if his gait i
s more stable.  The biggest problem facing and moving down the road is he is completely inactive at h
ome and refuses to do any kind of exercise.  I will be happy to see him in follow up once he is out o
f rehabilitation.

## 2017-12-29 ENCOUNTER — HOSPITAL ENCOUNTER (INPATIENT)
Dept: HOSPITAL 92 - ERS | Age: 82
LOS: 5 days | Discharge: NURSING, REHAB OR LONG-TERM CARE | DRG: 466 | End: 2018-01-03
Attending: FAMILY MEDICINE | Admitting: FAMILY MEDICINE
Payer: MEDICARE

## 2017-12-29 VITALS — BODY MASS INDEX: 19.5 KG/M2

## 2017-12-29 DIAGNOSIS — G62.9: ICD-10-CM

## 2017-12-29 DIAGNOSIS — A41.01: ICD-10-CM

## 2017-12-29 DIAGNOSIS — Z66: ICD-10-CM

## 2017-12-29 DIAGNOSIS — I95.9: ICD-10-CM

## 2017-12-29 DIAGNOSIS — D63.1: ICD-10-CM

## 2017-12-29 DIAGNOSIS — Z87.891: ICD-10-CM

## 2017-12-29 DIAGNOSIS — I25.10: ICD-10-CM

## 2017-12-29 DIAGNOSIS — I25.2: ICD-10-CM

## 2017-12-29 DIAGNOSIS — Z99.81: ICD-10-CM

## 2017-12-29 DIAGNOSIS — N17.9: ICD-10-CM

## 2017-12-29 DIAGNOSIS — Z79.52: ICD-10-CM

## 2017-12-29 DIAGNOSIS — B95.61: ICD-10-CM

## 2017-12-29 DIAGNOSIS — I12.9: ICD-10-CM

## 2017-12-29 DIAGNOSIS — R65.21: ICD-10-CM

## 2017-12-29 DIAGNOSIS — I48.0: ICD-10-CM

## 2017-12-29 DIAGNOSIS — T84.51XA: Primary | ICD-10-CM

## 2017-12-29 DIAGNOSIS — D53.9: ICD-10-CM

## 2017-12-29 DIAGNOSIS — J96.11: ICD-10-CM

## 2017-12-29 DIAGNOSIS — J43.1: ICD-10-CM

## 2017-12-29 DIAGNOSIS — Z96.641: ICD-10-CM

## 2017-12-29 DIAGNOSIS — N18.2: ICD-10-CM

## 2017-12-29 DIAGNOSIS — M00.051: ICD-10-CM

## 2017-12-29 LAB
ALBUMIN SERPL BCG-MCNC: 2.9 G/DL (ref 3.4–4.8)
ALBUMIN SERPL BCG-MCNC: 3.2 G/DL (ref 3.4–4.8)
ALP SERPL-CCNC: 65 U/L (ref 40–150)
ALP SERPL-CCNC: 79 U/L (ref 40–150)
ALT SERPL W P-5'-P-CCNC: (no result) U/L (ref 8–55)
ALT SERPL W P-5'-P-CCNC: (no result) U/L (ref 8–55)
ANION GAP SERPL CALC-SCNC: 10 MMOL/L (ref 10–20)
ANION GAP SERPL CALC-SCNC: 12 MMOL/L (ref 10–20)
ANISOCYTOSIS BLD QL SMEAR: (no result) (100X)
ANISOCYTOSIS BLD QL SMEAR: (no result) (100X)
APTT PPP: 29.3 SEC (ref 22.9–36.1)
AST SERPL-CCNC: 8 U/L (ref 5–34)
AST SERPL-CCNC: 9 U/L (ref 5–34)
BILIRUB SERPL-MCNC: 0.5 MG/DL (ref 0.2–1.2)
BILIRUB SERPL-MCNC: 0.9 MG/DL (ref 0.2–1.2)
BUN SERPL-MCNC: 17 MG/DL (ref 8.4–25.7)
BUN SERPL-MCNC: 19 MG/DL (ref 8.4–25.7)
CALCIUM SERPL-MCNC: 7.8 MG/DL (ref 7.8–10.44)
CALCIUM SERPL-MCNC: 8.7 MG/DL (ref 7.8–10.44)
CHLORIDE SERPL-SCNC: 104 MMOL/L (ref 98–107)
CHLORIDE SERPL-SCNC: 109 MMOL/L (ref 98–107)
CO2 SERPL-SCNC: 25 MMOL/L (ref 23–31)
CO2 SERPL-SCNC: 29 MMOL/L (ref 23–31)
CREAT CL PREDICTED SERPL C-G-VRATE: 0 ML/MIN (ref 70–130)
CREAT CL PREDICTED SERPL C-G-VRATE: 0 ML/MIN (ref 70–130)
CRYSTAL-AUWI FLAG: 0 (ref 0–15)
DIGOXIN SERPL-MCNC: (no result) NG/ML (ref 0.8–2)
GLOBULIN SER CALC-MCNC: 2.3 G/DL (ref 2.4–3.5)
GLOBULIN SER CALC-MCNC: 2.8 G/DL (ref 2.4–3.5)
GLUCOSE SERPL-MCNC: 80 MG/DL (ref 83–110)
GLUCOSE SERPL-MCNC: 81 MG/DL (ref 83–110)
HEV IGM SER QL: 1.8 (ref 0–7.99)
HGB BLD-MCNC: 10.9 G/DL (ref 14–18)
HGB BLD-MCNC: 9.3 G/DL (ref 14–18)
HYALINE CASTS #/AREA URNS LPF: (no result) LPF
INR PPP: 1
MACROCYTES BLD QL SMEAR: (no result) (100X)
MACROCYTES BLD QL SMEAR: (no result) (100X)
MCH RBC QN AUTO: 31 PG (ref 27–31)
MCH RBC QN AUTO: 31.4 PG (ref 27–31)
MCV RBC AUTO: 103 FL (ref 80–94)
MCV RBC AUTO: 103 FL (ref 80–94)
MDIFF COMPLETE?: YES
MDIFF COMPLETE?: YES
OVALOCYTES BLD QL SMEAR: (no result) (100X)
OVALOCYTES BLD QL SMEAR: (no result) (100X)
PATHC CAST-AUWI FLAG: 0 (ref 0–2.49)
PLATELET # BLD AUTO: 302 THOU/UL (ref 130–400)
PLATELET # BLD AUTO: 352 THOU/UL (ref 130–400)
PLATELET BLD QL SMEAR: (no result)
PLATELET BLD QL SMEAR: (no result)
POLYCHROMASIA BLD QL SMEAR: (no result) (100X)
POLYCHROMASIA BLD QL SMEAR: (no result) (100X)
POTASSIUM SERPL-SCNC: 3.9 MMOL/L (ref 3.5–5.1)
POTASSIUM SERPL-SCNC: 4 MMOL/L (ref 3.5–5.1)
PROTHROMBIN TIME: 13.7 SEC (ref 12–14.7)
RBC # BLD AUTO: 2.99 MILL/UL (ref 4.7–6.1)
RBC # BLD AUTO: 3.48 MILL/UL (ref 4.7–6.1)
RBC UR QL AUTO: (no result) HPF (ref 0–3)
SCHISTOCYTES BLD QL SMEAR: (no result) (100X)
SCHISTOCYTES BLD QL SMEAR: (no result) (100X)
SODIUM SERPL-SCNC: 140 MMOL/L (ref 136–145)
SODIUM SERPL-SCNC: 141 MMOL/L (ref 136–145)
SP GR UR STRIP: 1.01 (ref 1–1.04)
SP GR UR STRIP: 1.02 (ref 1–1.04)
SPERM-AUWI FLAG: 0 (ref 0–9.9)
SPHEROCYTES BLD QL SMEAR: (no result) (100X)
SPHEROCYTES BLD QL SMEAR: (no result) (100X)
WBC # BLD AUTO: 24.6 THOU/UL (ref 4.8–10.8)
WBC # BLD AUTO: 30.9 THOU/UL (ref 4.8–10.8)
WBC UR QL AUTO: (no result) HPF (ref 0–3)
YEAST-AUWI FLAG: 0 (ref 0–25)

## 2017-12-29 PROCEDURE — 72170 X-RAY EXAM OF PELVIS: CPT

## 2017-12-29 PROCEDURE — 80202 ASSAY OF VANCOMYCIN: CPT

## 2017-12-29 PROCEDURE — C1751 CATH, INF, PER/CENT/MIDLINE: HCPCS

## 2017-12-29 PROCEDURE — C1713 ANCHOR/SCREW BN/BN,TIS/BN: HCPCS

## 2017-12-29 PROCEDURE — 80162 ASSAY OF DIGOXIN TOTAL: CPT

## 2017-12-29 PROCEDURE — 85025 COMPLETE CBC W/AUTO DIFF WBC: CPT

## 2017-12-29 PROCEDURE — 87077 CULTURE AEROBIC IDENTIFY: CPT

## 2017-12-29 PROCEDURE — 71010: CPT

## 2017-12-29 PROCEDURE — 96375 TX/PRO/DX INJ NEW DRUG ADDON: CPT

## 2017-12-29 PROCEDURE — 82607 VITAMIN B-12: CPT

## 2017-12-29 PROCEDURE — 93005 ELECTROCARDIOGRAM TRACING: CPT

## 2017-12-29 PROCEDURE — 86900 BLOOD TYPING SEROLOGIC ABO: CPT

## 2017-12-29 PROCEDURE — 87086 URINE CULTURE/COLONY COUNT: CPT

## 2017-12-29 PROCEDURE — P9016 RBC LEUKOCYTES REDUCED: HCPCS

## 2017-12-29 PROCEDURE — 81003 URINALYSIS AUTO W/O SCOPE: CPT

## 2017-12-29 PROCEDURE — 87040 BLOOD CULTURE FOR BACTERIA: CPT

## 2017-12-29 PROCEDURE — 86901 BLOOD TYPING SEROLOGIC RH(D): CPT

## 2017-12-29 PROCEDURE — 36569 INSJ PICC 5 YR+ W/O IMAGING: CPT

## 2017-12-29 PROCEDURE — 80053 COMPREHEN METABOLIC PANEL: CPT

## 2017-12-29 PROCEDURE — 83605 ASSAY OF LACTIC ACID: CPT

## 2017-12-29 PROCEDURE — 99292 CRITICAL CARE ADDL 30 MIN: CPT

## 2017-12-29 PROCEDURE — 85730 THROMBOPLASTIN TIME PARTIAL: CPT

## 2017-12-29 PROCEDURE — 86850 RBC ANTIBODY SCREEN: CPT

## 2017-12-29 PROCEDURE — 93010 ELECTROCARDIOGRAM REPORT: CPT

## 2017-12-29 PROCEDURE — 80048 BASIC METABOLIC PNL TOTAL CA: CPT

## 2017-12-29 PROCEDURE — 87070 CULTURE OTHR SPECIMN AEROBIC: CPT

## 2017-12-29 PROCEDURE — 85610 PROTHROMBIN TIME: CPT

## 2017-12-29 PROCEDURE — 36430 TRANSFUSION BLD/BLD COMPNT: CPT

## 2017-12-29 PROCEDURE — 36415 COLL VENOUS BLD VENIPUNCTURE: CPT

## 2017-12-29 PROCEDURE — 36416 COLLJ CAPILLARY BLOOD SPEC: CPT

## 2017-12-29 PROCEDURE — C1769 GUIDE WIRE: HCPCS

## 2017-12-29 PROCEDURE — C1776 JOINT DEVICE (IMPLANTABLE): HCPCS

## 2017-12-29 PROCEDURE — 51702 INSERT TEMP BLADDER CATH: CPT

## 2017-12-29 PROCEDURE — 87149 DNA/RNA DIRECT PROBE: CPT

## 2017-12-29 PROCEDURE — 96365 THER/PROPH/DIAG IV INF INIT: CPT

## 2017-12-29 PROCEDURE — 94640 AIRWAY INHALATION TREATMENT: CPT

## 2017-12-29 PROCEDURE — 87186 SC STD MICRODIL/AGAR DIL: CPT

## 2017-12-29 PROCEDURE — 87205 SMEAR GRAM STAIN: CPT

## 2017-12-29 PROCEDURE — 82747 ASSAY OF FOLIC ACID RBC: CPT

## 2017-12-29 PROCEDURE — 87804 INFLUENZA ASSAY W/OPTIC: CPT

## 2017-12-29 PROCEDURE — 96361 HYDRATE IV INFUSION ADD-ON: CPT

## 2017-12-29 PROCEDURE — 85027 COMPLETE CBC AUTOMATED: CPT

## 2017-12-29 NOTE — RAD
AP VIEW OF THE CHEST

12/29/17

 

INDICATION:

History of sepsis. 

 

IMPRESSION:  

No change from the comparison dated 12/29/17 at 2:41 p.m.

 

COMMENTS:

Right pleural parenchymal opacity persists. Loop recorder overlying the left chest wall is stable. No
 pneumothorax is evident. Chronic lung changes are stable. Chronic right clavicle fracture is stable.
 

 

POS: [facility]

## 2017-12-29 NOTE — CON
DATE OF CONSULTATION:  12/29/2017

 

HISTORY OF PRESENT ILLNESS:  Mr. Moseley is a pleasant 82-year-old gentleman who presents today wit
h a 5-day history of increasing soreness in his right hip and nicole pain beginning this morning.  He 
is status post right femoral neck fracture on 11/12/2017, and on 11/13/2017 underwent a right hip hem
iarthroplasty with Dr. Robert Hassan.  The patient had a somewhat difficult hospital course secondary 
to chronic lung and cardiac disease, but was eventually discharged from the hospital and has been doi
ng relatively well at home with no significant problems related to this right hip and so this recent 
history.  Upon presentation at [facility] today, he was found to have a temperature of 101.2
 degrees rectally with initial blood pressure of 89/47, although he was not tachycardic.  Further wor
kup included x-rays of the hip that shows a hemiarthroplasty and good alignment and position and a la
b that showed a white blood cell count of 24.6.  He was given 2 liters of normal saline; however, con
tinues to have diastolic pressures in the 40 mmHg range.  The patient is also found to have some swel
ling and redness around the lateral hip skin incision.  There is also some seropurulent drainage comi
ng from a small opening at the superior portion of the incision site.  This was swabbed by the emerge
ncy room nurse and then I also sent off a second swab from deeper for Gram stain culture and sensitiv
ity.  The patient is now being admitted to the Hospitalist service secondary to his significant histo
ry of respiratory and cardiac disease.

 

PAST MEDICAL HISTORY:  Remarkable for peripheral neuropathy, coronary artery disease with myocardial 
infarction in 2001, benign prostatic hypertrophy, hypertension, COPD, and recurrent pleural effusions
.

 

PAST SURGICAL HISTORY:  Includes carotid endarterectomy, appendectomy, aspiration of pleural effusion
 on multiple occasions.

 

MEDICATIONS:  Per the emergency room record include aspirin, gabapentin, tamsulosin, prednisone, albu
terol, temazepam, vitamin D3, tramadol, folic acid, Colace, digoxin, and carvedilol.

 

ALLERGIES:  None known.

 

FAMILY HISTORY:  Noncontributory.

 

SOCIAL HISTORY:  Does have a history of chewing tobacco as well as an extensive history of cigarette 
smoking in the past with over a 60-pack-year history.  He denies history of alcohol or IV drug use.

 

REVIEW OF SYSTEMS:  The patient has had some recent low grade fevers over the last 24 hours and chill
s.  He does report some chronic shortness of breath, but does not report any new chest pain.  He david
es any new numbness or tingling radiating down the leg other than his baseline neuropathy.

 

PHYSICAL EXAMINATION:

VITAL SIGNS:  On admission, he was found to have a blood pressure of 89/47, pulse 75, respiratory rat
e 16, and O2 saturation 99% on 2 liters oxygen and a temperature of 101.2 degrees Fahrenheit rectally
.

HEENT:  Atraumatic, normocephalic.

HEART:  Remarkable for irregularly irregular rhythm with somewhat faint heart sounds.  I do not appre
ciate any obvious murmur.

LUNGS:  Remarkable for distant breath sounds with somewhat prolonged expiratory phase.  He does have 
reasonable air movement bilaterally with no crackles or rhonchi.  Chest wall is nontender.

ABDOMEN:  Soft, flat, nontender.

EXTREMITIES:  Remarkable for this right lower extremity with a healed lateral incision from hip arthr
oplasty except for a small 1 cm long area of drainage at the superior portion of the wound.  In addit
ion to this seropurulent drainage, there is erythema, swelling, and tenderness to palpation directly 
along this lateral thigh.  With log rolling of the thigh and flexion of the hip, he does complain of 
groin pain.  The knee, ankle and foot appear atraumatic.

 

LABORATORY DATA:  The patient was found to have a white blood cell count of 24.6,  hematocrit of 35.7
, platelet count of 352,000.  His chemistry panel is remarkable for glucose of 81 and lactic acid lev
el of 2.4.  X-rays are remarkable for a 2-view right hip that shows a hip hemiarthroplasty in excelle
nt alignment which is some overlying soft tissue swelling.  Chest x-ray by report is remarkable for a
 stable-appearing pleural and parenchymal opacity at right base with some volume loss.

 

ASSESSMENT:  An 82-year-old gentleman now 6 weeks status post right hip hemiarthroplasty for femoral 
neck fracture with symptoms and workup consistent with infection.

 

PLAN:  At this time, the patient will be admitted to the Hospitalist Service due to concerns regardin
g sepsis, his chronic lung disease and underlying cardiac history.  The patient will require further 
resuscitation and if stable, we will proceed to the operating room tomorrow for irrigation and debrid
ement with probable removal of hip hemiarthroplasty and possible insertion of spacer device.  This ev
ening, I have had a discussion with family regarding the serious nature of this problem, especially i
n light of patient's underlying poor health.  We will start vancomycin and Rocephin, now that swabs h
ave been obtained from the wound.  The patient will be n.p.o. after midnight.  I have also had the pl
easure discussing the case with Dr. Robert Hassan this evening and he will be seeing the patient in th
e morning to take over care.

## 2017-12-29 NOTE — HP-2
TIME AND DATE OF SERVICE:  17:40 on 12/29/2017

 

CODE STATUS:  DNR/DNI.

 

PRIMARY CARE PHYSICIAN:  [facility].

 

ATTENDING:  Anderson Grace M.D.

 

RESIDENT:  David Sams MD

 

HISTORIAN:  The patient and family.

 

CHIEF COMPLAINT:  Hip pain.

 

HISTORY OF PRESENT ILLNESS:  Teddy Moseley is an 82-year-old male with past medical history of CO
PD, coronary artery disease status post MI, paroxysmal atrial fibrillation and neuropathy from [facility], who is status post right hip replacement on 11/13.  Patient was in the hospital 5 days after his 
hip replacement and then went to a rehab facility for 21 days.  He has since been home and has been a
mbulatory and continuing home PT.  Last night he started having increased hip pain and he had fevers 
and chills this morning.  He has also had recent drainage of purulent fluid out of the hip wound, tod
jules as well.  Family brought him to the ER.  His fever was up to 103 in the ER and surgical site was d
raining pus.  He denies any headache, chest pain, shortness of breath, palpitations, nausea, vomiting
, diarrhea, abdominal pain.  Daughter says that patient is at his normal baseline mental status.  In 
the ED, he received vancomycin 1 gram, hydrocortisone 100 mg, normal saline at 30 mL per kg plus anot
her 1 liter bolus.  He also received Toradol 30 mg and Rocephin 2 grams as well as DuoNebs.

 

PAST MEDICAL HISTORY:

1.  COPD.

2.  History of myocardial infarction.

3.  Coronary artery disease.

4.  Paroxysmal atrial fibrillation.

5.  Emphysema.

6.  Neuropathy.

 

PAST SURGICAL HISTORY:

1.  Left endarterectomy.

2.  Right hip surgery on 11/13/2017.

3.  Right lung surgery.

 

ALLERGIES:  No known drug allergies.

 

MEDICATIONS:

1.  Aspirin 81 mg p.o. daily.

2.  Gabapentin 600 mg p.o. t.i.d.

3.  Tamsulosin 0.4 mg p.o. daily.

4.  Prednisone 10 mg p.o. daily.

5.  Ipratropium-albuterol 0.5 mg-3 mg INH t.i.d.

6.  Albuterol sulfate 90 mcg inhaler as needed.

7.  Temazepam 50 mg p.o. daily at bedtime.

8.  Vitamin D3 of 5000 units p.o. daily.

9.  Tramadol 50 mg p.o. b.i.d.

10.  Atorvastatin 40 mg p.o. daily.

11.  Folic acid 1 mg 2 tabs p.o. daily.

12.  Colace 100 mg p.o. daily.

13.  Digoxin 125 mcg p.o. daily.

14.  Budesonide 0.25 mg INH b.i.d.

15.  Carvedilol 3.125 mg p.o. daily.

 

FAMILY HISTORY:  Unremarkable.

 

SOCIAL HISTORY:  The patient quit smoking 15 years ago.  He denies any alcohol or drug use.  He is re
tired and  and lives at home with his wife.

 

REVIEW OF SYSTEMS:  A 12-point review of systems including general, eyes, ENT, respiratory, CV, GI, G
U, skin, musculoskeletal, neuro and psych were all reviewed and were negative, unless otherwise state
d in the HPI.

 

PHYSICAL EXAMINATION:

VITAL SIGNS:  Blood pressure 103/53, pulse is 79, respiratory rate 16, temperature 101.2, pulse ox 10
0% on 2 liters.  Current weight is 65 kilograms.  Patient is normally on 2 liters at home.

GENERAL:  The patient is alert and oriented x3, no acute distress, well-developed, well-nourished, an
d appropriately interactive.  The patient is ill-appearing.

HEENT:  Eyes, pupils equal, round, reactive to light and accommodation.  Extraocular muscles intact. 
 Conjunctivae within normal limits.

ENT:  Tympanic membranes pearly gray without bulging or erythema.  Nasal mucosa and oropharynx within
 normal limits.

NECK:  Supple, without lymphadenopathy or thyromegaly.

CARDIOVASCULAR:  Regular rate and rhythm.  No murmurs or gallops.  Radial and pedal pulses equal bila
terally.

RESPIRATORY:  Normal effort, no retractions.  There are scattered wheezes heard bilaterally.

SKIN:  Warm and dry without cyanosis or lesions.  Right hip surgical site has erythema and an open in
cision.

ABDOMEN:  Soft, nontender, bowel sounds x4.  No mass or distention.

EXTREMITIES:  No cyanosis, edema or clubbing.

MUSCULOSKELETAL:  Structure and tone within normal limits.  Range of motion decreased in the right lo
wer extremity secondary to pain.  Full range of motion in all other extremities.

NEUROLOGIC:  No focal deficits.  Sensation within normal limits.

PSYCHIATRIC:  Appropriate.

 

LABORATORY DATA:  White blood cell count 30.9 with 68% neutrophils and 20% bands, hemoglobin 9.3, hem
atocrit 30.7, MCV of 103, platelets 302.  Sodium 140, potassium 4.0, chloride 109, carbon dioxide 25,
 BUN 19, creatinine 1.29, glucose 80, calcium 7.8, total protein 5.2, albumin 2.9, total bilirubin 0.
5, AST 9, ALT less than 7, alkaline phosphatase 65.  PT 13.7, INR 1.0, PTT 29.3.  UA was unremarkable
.  Chest x-ray showed stable appearing pleural and parenchymal opacity changes in the right base with
 some volume loss and biapical pleural thickening, worse on the right side with some associated right
-sided volume loss.  Old distal right clavicular fracture and no evidence for new confluent process o
r overt edema.  Right hip x-ray showed an interval increase in soft tissue fullness over the right th
igh from the comparison examination.

 

ASSESSMENT AND PLAN:  An 82-year-old man status post right hip replacement on 11/13/2017 with increas
ed pain, fevers, chills.

1.  Septic shock secondary to septic arthritis versus wound infection.  Admit to ICU.  Pulmonology an
d Orthopedics has been consulted.  We will consult Infectious Disease in the morning.  Dr. Cody Martinez has seen the patient in the ED and has scheduled surgery for tomorrow morning.  Patient is 
n.p.o. at midnight.  We will continue intravenous vancomycin, Rocephin and intravenous fluids normal 
saline at 105 mL an hour and no pressors needed at this time.  We will continue to monitor vital sign
s closely.

2.  Acute kidney injury on chronic kidney disease stage II.  We will trend GFR.  IV fluids at 105 mL 
an hour.  The patient is status post fluid bolus in the ER.

3.  Macrocytic anemia.  Check red blood cell, folate and B12.  The patient is currently on folate sup
plementation.

4.  Coronary artery disease.  Hold beta blocker and statin at this time until patient is stable and a
fter surgery.

5.  Chronic obstructive pulmonary disease, Pulmicort and DuoNebs.

6.  Neuropathy, gabapentin.

7.  Paroxysmal atrial fibrillation.  We will check a digoxin level and consider restarting at that ti
me.

8.  Diet:  Regular diet with n.p.o. at midnight.

9.  Activity:  Bed rest.

10.  Fall precautions.

11.  Code status:  DNR/DNI.

 

DISPOSITION AND LENGTH OF HOSPITAL STAY:  2-3 days.

 

Symptomatic medications will be provided.  History, physical exam as well as management discussed fiorella Grace.

## 2017-12-29 NOTE — RAD
RIGHT HIP TWO VIEW

12/29/17

 

HISTORY: 

Pain. 

 

COMPARISON:  

None. 

 

FINDINGS:  

Relative to the prior examination, the soft tissue fullness of the right lateral thigh has markedly i
ncreased. There is no evidence of hardware fracture or malalignment. The superior and inferior pubic 
rami are intact. 

 

IMPRESSION:  

Interval increase in soft tissue fullness over the right thigh from the comparison examination. Recom
mend clinical correlation for pain and tenderness. 

 

POS: C

## 2017-12-29 NOTE — RAD
AP VIEW OF THE PELVIS:

12/29/17

 

INDICATION:

Preoperative examination for hip fracture.

 

COMPARISON:  

Two views of the right hip dated 12/29/17 and 11/13/17.

 

Right hemiarthroplasty does not appear appreciably changed from the comparison. Soft tissue fullness 
surrounding the right hip is similar. Scattered vascular calcification is similar. No acute fracture 
or subluxation is grossly evident. 

 

IMPRESSION:  

Stable exam. 

 

POS: GRZEGORZ

## 2017-12-29 NOTE — RAD
UPRIGHT PORTABLE CHEST ONE VIEW:

 

HISTORY:

An 82-year-old male with cough and sepsis.

 

COMPARISON: 

11/22/2017

 

FINDINGS:

A loop recorder overlies the left chest.  There are pleural and parenchymal opacity changes in the ri
ght base, which some associated fluoroscopy, as well as some right apical pleural thickening, showing
 little change from prior studies, dating back to 11/15/2017.  There is left apical pleural thickenin
g.  Mild hyperinflation in the left lung.

 

IMPRESSION:

Stable appearing pleural and parenchymal opacity changes in the right base with some volume loss.  Bi
apical pleural thickening, worse on the right side, with some associated right-sided volume loss.  Ol
d distal right clavicle fracture.  No evidence for new confluent process or overt edema.

 

POS: RENAOT

## 2017-12-30 LAB
ALBUMIN SERPL BCG-MCNC: 2.6 G/DL (ref 3.4–4.8)
ALP SERPL-CCNC: 55 U/L (ref 40–150)
ALT SERPL W P-5'-P-CCNC: (no result) U/L (ref 8–55)
ANION GAP SERPL CALC-SCNC: 10 MMOL/L (ref 10–20)
ANISOCYTOSIS BLD QL SMEAR: (no result) (100X)
AST SERPL-CCNC: 8 U/L (ref 5–34)
BILIRUB SERPL-MCNC: 0.3 MG/DL (ref 0.2–1.2)
BUN SERPL-MCNC: 20 MG/DL (ref 8.4–25.7)
CALCIUM SERPL-MCNC: 8.1 MG/DL (ref 7.8–10.44)
CHLORIDE SERPL-SCNC: 110 MMOL/L (ref 98–107)
CO2 SERPL-SCNC: 26 MMOL/L (ref 23–31)
CREAT CL PREDICTED SERPL C-G-VRATE: 51 ML/MIN (ref 70–130)
GLOBULIN SER CALC-MCNC: 2.3 G/DL (ref 2.4–3.5)
GLUCOSE SERPL-MCNC: 165 MG/DL (ref 83–110)
HGB BLD-MCNC: 9.1 G/DL (ref 14–18)
MCH RBC QN AUTO: 30.9 PG (ref 27–31)
MCV RBC AUTO: 103 FL (ref 80–94)
MDIFF COMPLETE?: YES
PLATELET # BLD AUTO: 281 THOU/UL (ref 130–400)
PLATELET BLD QL SMEAR: (no result)
POTASSIUM SERPL-SCNC: 4.2 MMOL/L (ref 3.5–5.1)
RBC # BLD AUTO: 2.93 MILL/UL (ref 4.7–6.1)
SCHISTOCYTES BLD QL SMEAR: (no result) (100X)
SODIUM SERPL-SCNC: 142 MMOL/L (ref 136–145)
WBC # BLD AUTO: 29.6 THOU/UL (ref 4.8–10.8)

## 2017-12-30 PROCEDURE — 0SR90J9 REPLACEMENT OF RIGHT HIP JOINT WITH SYNTHETIC SUBSTITUTE, CEMENTED, OPEN APPROACH: ICD-10-PCS | Performed by: ORTHOPAEDIC SURGERY

## 2017-12-30 PROCEDURE — 0SP90JZ REMOVAL OF SYNTHETIC SUBSTITUTE FROM RIGHT HIP JOINT, OPEN APPROACH: ICD-10-PCS | Performed by: ORTHOPAEDIC SURGERY

## 2017-12-30 RX ADMIN — VANCOMYCIN HYDROCHLORIDE SCH MLS: 1 INJECTION, SOLUTION INTRAVENOUS at 17:06

## 2017-12-30 RX ADMIN — VANCOMYCIN HYDROCHLORIDE SCH MLS: 1 INJECTION, SOLUTION INTRAVENOUS at 05:31

## 2017-12-30 RX ADMIN — HYDROCORTISONE SODIUM SUCCINATE SCH MG: 100 INJECTION, POWDER, FOR SOLUTION INTRAMUSCULAR; INTRAVENOUS at 17:06

## 2017-12-30 RX ADMIN — HYDROCODONE BITARTRATE AND ACETAMINOPHEN PRN TAB: 10; 325 TABLET ORAL at 17:16

## 2017-12-30 RX ADMIN — HYDROCORTISONE SODIUM SUCCINATE SCH: 100 INJECTION, POWDER, FOR SOLUTION INTRAMUSCULAR; INTRAVENOUS at 13:53

## 2017-12-30 RX ADMIN — HYDROCODONE BITARTRATE AND ACETAMINOPHEN PRN TAB: 10; 325 TABLET ORAL at 22:10

## 2017-12-30 NOTE — PDOC.FM
- Subjective


Subjective: 





Pt seen at bedside in [facility] overnight. Notes his pain is controlled. No 

complaints at this time.





- Objective


MAR Reviewed: Yes


Vital Signs & Weight: 


 Vital Signs (12 hours)











  Temp Pulse Resp Pulse Ox


 


 12/30/17 04:00  98.6 F   


 


 12/30/17 03:54   60  24 H  100


 


 12/30/17 00:00  98.6 F   


 


 12/29/17 20:00  98.7 F  74  20  100








 Most Recent Monitor Data











Heart Rate from ECG            81


 


NIBP                           138/48


 


NIBP BP-Mean                   83


 


Respiration from ECG           19


 


SpO2                           100














I&O: 


 











 12/28/17 12/29/17 12/30/17





 06:59 06:59 06:59


 


Intake Total   250


 


Output Total   1040


 


Balance   -790











Result Diagrams: 


 12/30/17 03:46





 12/30/17 03:46





<Lucas Wiggins - Last Filed: 12/30/17 07:49>





- Objective


Vital Signs & Weight: 


 Vital Signs (12 hours)











  Temp Pulse Resp Pulse Ox


 


 12/30/17 11:00  99.1 F   


 


 12/30/17 10:15   74  


 


 12/30/17 08:00  98.5 F  74  19 


 


 12/30/17 07:34   66  19 


 


 12/30/17 04:00  98.6 F   


 


 12/30/17 03:54   60  24 H  100








 Weight











Admit Weight                   68.9 kg


 


Weight                         68.9 kg











 Most Recent Monitor Data











Heart Rate from ECG            76


 


NIBP                           143/52


 


NIBP BP-Mean                   68


 


Respiration from ECG           22


 


SpO2                           93














I&O: 


 











 12/29/17 12/30/17 12/31/17





 06:59 06:59 06:59


 


Intake Total  1506 0


 


Output Total  1085 200


 


Balance  421 -200











Result Diagrams: 


 12/30/17 03:46





 12/30/17 03:46





<Anderson Grace - Last Filed: 12/30/17 13:22>





Phys Exam





- Physical Examination


Constitutional: NAD


HEENT: PERRLA


Respiratory: no rales, no rhonchi, wheezing present


scattered wheezing bilaterally


Cardiovascular: RRR


Gastrointestinal: soft, non-tender


Musculoskeletal: pulses present


Neurological: moves all 4 limbs


Psychiatric: normal affect, A&O x 3


Deviation from normal: right hip incision surrounding erythema





<Lucas Wiggins - Last Filed: 12/30/17 07:49>





Dx/Plan


(1) Septic joint


Status: Acute   


  QualifierTitle:    Septic arthritis location: hip   Septic arthritis organism

: due to unspecified organism   Laterality: right   Qualified Code(s): M00.9 - 

Pyogenic arthritis, unspecified   


Plan: 





-pt presented for worsening hip pain, swelling, and redness at incision site s/

p right hip hemiarthroplasty on 11/12/17


-XR reveals areas of inflammation surrounding hip replacement


-pt also presented with fever, leukocytosis, and hypotension initially meeting 

diagnostic criteria for septic shock


-pt was admitted to CCU


-orthopedic surgery consulted


-after aggressive IVF and addition of hydrocortisone, pt's BP stabilized and pt 

did not require pressor support


-MAP consistently above 80, continue to monitor closely


-blood and wound cultures have been drawn


-pt started on rocephin and vancomycin per surgical team. vanc trough scheduled 

12/31 AM


-per surgery, plans for OR today for I&D and probable removal of 

hemiarthroplasty with possibility of spacer insertion


-continue to monitor, plan per surgery team








(2) GERTRUDE (acute kidney injury)


Code(s): N17.9 - ACUTE KIDNEY FAILURE, UNSPECIFIED   Status: Acute   


Plan: 





-GERTRUDE on CKD2


-continue IVF and continue to monitor








(3) COPD (chronic obstructive pulmonary disease)


Status: Acute   


  QualifierTitle:    COPD type: emphysema   Emphysema type: panlobular   

Qualified Code(s): J43.1 - Panlobular emphysema   


Plan: 





-continue home medications and supplemental oxygen as needed








(4) Paroxysmal atrial fibrillation with RVR


Code(s): I48.0 - PAROXYSMAL ATRIAL FIBRILLATION   Status: Chronic   


Plan: 





-currently NSR


-will continue home digoxin and continue to monitor closely








(5) Peripheral neuropathy


Code(s): G62.9 - POLYNEUROPATHY, UNSPECIFIED   Status: Chronic   


  QualifierTitle:    Peripheral neuropathy type: polyneuropathy, unspecified   

Qualified Code(s): G62.9 - Polyneuropathy, unspecified   


Plan: 





-continue home gabapentin








- Plan


Plan: 





dispo: Pt stable. Pt has sepsis with possible septic joint vs wound infection. 

Surgery recommendations greatly appreciated. Plan for OR today. Continue to 

monitor closely.





<Lucas Wiggins - Last Filed: 12/30/17 07:49>





Attending Addendum





- Attending Addendum


I personally evaluated the patient in ICU A6 and discussed the management with 

Dr. Wiggins.


I agree with the History, Examination, Assessment and Plan documented above 

with any addition or exceptions noted below.


He feels well and did well overnight with good BP.  Lungs: CTA, Cor: RRR, I do 

not appreciate a murmur this morning.  We anticipate he will be going to OR 

today for wound exploration.


A:  Severe sepsis with post op Right hip arthroplasty superficial wound 

infection versus septic artificial joint. 


COPD, Hx Paroxysmal a-fib currently in NSR, Peripheral neuropathy, History of 

multiple falls.  


P:  Wound exploration per orthopedic surgery.  Continue ceftriaxone and 

vancomycin IV. [facility]





<Anderson Grace - Last Filed: 12/30/17 13:22>

## 2017-12-30 NOTE — CON
DATE OF CONSULTATION:  12/30/2017

 

REASON FOR CONSULTATION:  Sepsis.

 

HISTORY OF PRESENT ILLNESS:  This is an 82-year-old male who came to the 
hospital last night with drainage coming from a right hip wound.  He had his 
right hip replaced on 11/13/2017.  He has been experiencing fever and chills 
and occasional chest pain when he inhales.  He knows of no alleviating or 
aggravating factors.

 

PAST MEDICAL HISTORY:  Obtained from talking with the patient and reviewing old 
records in chart:

1.  Chronic obstructive pulmonary disease - followed by Dr. Barillas.

2.  Myocardial infarction.

3.  Coronary artery disease.

4.  Paroxysmal atrial fibrillation.

5.  Emphysema.

6.  Neuropathy.

 

PAST SURGICAL HISTORY:

1.  He has had a right lung decortication.

2.  Left carotid endarterectomy.

3.  Right hip replacement.

 

ALLERGIES:  None.

 

MEDICATIONS PRIOR TO ADMISSION:  Aspirin 81 mg daily, gabapentin 600 mg t.i.d., 
tamsulosin 0.4 mg daily, prednisone 10 mg daily, DuoNeb t.i.d., albuterol 
metered dose inhaler as needed, temazepam 50 mg nightly, vitamin D3 5000 units 
daily, tramadol 50 mg twice daily, atorvastatin 40 mg daily, folate 1 mg 2 
times daily, Colace 100 mg daily, digoxin 125 mg daily, budesonide 0.25 mg 
inhaled b.i.d., carvedilol 3.125 mg b.i.d.

 

FAMILY MEDICAL HISTORY:  Unremarkable.

 

SOCIAL HISTORY:  Quit smoking 15 years ago.  He does not consume alcohol.  He 
does not use any illicit drugs.  Lives at home with his wife.

 

REVIEW OF SYSTEMS:  Twelve point review of systems otherwise negative except 
for the hip drainage and the chest pain when he inhales.

 

PHYSICAL EXAMINATION:

VITAL SIGNS:  Temperature 98.5, pulse 73, blood pressure 114/46, O2 sat 96% on 
2 liters nasal cannula.

GENERAL:  He is awake and alert and in no acute distress.

HEENT:  Sclerae are anicteric.  Oropharynx clear.

NECK:  Without adenopathy or JVD.

LUNGS:  Clear to auscultation anteriorly.

CARDIOVASCULAR:  S1, S2 regular without murmur.

ABDOMEN:  Soft, nontender, nondistended.

EXTREMITIES:  No clubbing, cyanosis, or edema.  No rashes.  He does have some 
drainage from his right buttock area where he had the hip surgery.

NEUROLOGIC:  He is alert and oriented x3.

 

LABORATORY DATA:  Sodium 142, potassium 4.2, chloride 110, CO2 26, BUN 20, 
creatinine 1.1, glucose 165.  White blood cell count 29.6, hemoglobin 9.1, 
hematocrit 30.3, platelet count 281.  I reviewed his chest x-ray personally and 
it demonstrates blunting of the right costophrenic angle which is consistent 
with previous decortication.

 

ASSESSMENT:

1.  Sepsis syndrome - he has Staphylococcal infection of his hip.

2.  Underlying chronic obstructive pulmonary disease which is clinically stable.

3.  Multiple other medical problems as listed above.

 

PLAN:  The patient is scheduled to have his hip prosthesis removed today.  He 
will need to be on stress dose steroids perioperatively given his chronic use 
of prednisone.  Continue with nebulization treatments, add Brovana and 
Pulmicort.



70 minutes are spent at bedside and on the patient's hospital unit

 

[facility]

## 2017-12-30 NOTE — CON
DATE OF CONSULTATION:  12/30/2017

 

REASON FOR CONSULTATION:  Right hip pain.

 

HISTORY OF PRESENT ILLNESS:  Ms. Moseley well known to me, 82-year-old gentleman who has a chronic 
lung disease, steroid dependent.  I did hemiarthroplasty on his hip about 6-7 weeks ago.  For the las
t 4-5 days, he has had increasing pain, presented with drainage to the ER yesterday.  Cultures were o
btained last night which showed staph species.  He has remained with a high white count of 30,000 hernesto
n on IV antibiotics, shaking chills and high fevers.  Does not appear to be improving with medical tr
eatment.

 

His lab shows hip surprisingly not very much erythema, but a small opening proximally with some drain
age, pain with manipulation of the hip.

 

Radiographs show well well fix in the arthroplasty.  Plan at this time is for complete resection.  Th
e implants are clean.  The intramedullary canal does cemented.  Total hip replacement with antibiotic
 loaded cement to act as a spacer.  _____ probably as a permanent hip implant and way to deliver high
 dose antibiotics.  Family understands the risk of infection, stiffness, dislocation, blood clots, tr
ansfusion, and death.  They understand he is at very high risk for surgery.  He is a DNR at this time
 and they would like to proceed with surgery.

## 2017-12-30 NOTE — RAD
RIGHT HIP TWO VIEWS:

 

HISTORY:

Total hip arthroplasty.

 

FINDINGS/IMPRESSION:

There are post-op changes of right hip hemiarthroplasty in good position and alignment.  A femoral he
ad prosthesis is present.

 

POS: RENATO

## 2017-12-30 NOTE — OP
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

 

TITLE OF PROCEDURE:  Removal of hardware and conversion of previous hip surgery to total hip arthropl
asty using high dose antibiotic impregnated cement.

 

SURGEON:  Robert Hassan M.D.

 

FIRST ASSISTANT:  Augustine Martini PA-C.

 

BLOOD LOSS:  300.

 

SPECIMEN:  None.

 

DRAINS:  None.

 

COMPLICATIONS:  None.

 

CULTURES:  Were already obtained, which showed Staphylococcus aureus.

 

DESCRIPTION OF PROCEDURE:  The patient was taken to the operating where general anesthesia was induce
d.  He was already on vancomycin scheduled and Rocephin scheduled.  He was given another dose of Roce
phin 2 grams prior to begin the surgical procedure.  He was placed in left lateral decubitus position
, right leg was prepped and draped in the usual fashion.  I opened up the old incision, dissected reggie
n to the IT band, several 100 mL of watery purulent material was identified.  The abductor mechanism 
had partially healed, this was taken down.  The hip capsule was entered and there was purulent materi
al within this area as well.  Hip was dislocated and with some difficulty, the femoral implant was ex
tracted.  I then cleaned the inside of the femoral canal with curettes.  I used flexible reamers to r
eam up to size 13 mm removing all the endosteal surface of the bone, which was previously contaminate
d.  Then used endosteal brush with pulsatile lavage  to further clean the intramedullary can
al, I used a size 5 Ignacia cemented femoral implant.  This was trialled and appeared to be appropria
te size.  Attention was turned to the acetabulum.  Complete synovectomy was performed.  Complete caps
ulectomy was performed.  The acetabulum was exposed and I reamed up to size 55 mm in the acetabulum a
ll the way down to the true medial wall.  I then prepared antibiotic cement using a total of 3.4 gram
s of tobramycin, 3 grams of vancomycin.  I mixed the cement until it was a doughy consistency and I c
emented the acetabulum into place.  This was an F type liner, 40 mm with X3 polyethylene.  I cemented
 the #5 Ignacia cemented total hip stem into place using the same high dose antibiotic.  I then trial
ed and found a size +5, which gave the best fit, and stability.  Trials were removed, +5 femoral head
 x 40 mm impacted into place.  Hip was reduced, checked for range motion and appeared to be stable.  
Irrigation performed again.  The abductors repaired with #1 Vicryl.  The IT band was repaired with #1
 Vicryl and #2 Quill, subcutaneous closed with 2-0 Quill, skin was closed with 2-0 Prolene.  Sterile 
dressings applied.  There were no complications.

## 2017-12-31 LAB
ANION GAP SERPL CALC-SCNC: 8 MMOL/L (ref 10–20)
BUN SERPL-MCNC: 21 MG/DL (ref 8.4–25.7)
CALCIUM SERPL-MCNC: 7.8 MG/DL (ref 7.8–10.44)
CHLORIDE SERPL-SCNC: 110 MMOL/L (ref 98–107)
CO2 SERPL-SCNC: 26 MMOL/L (ref 23–31)
CREAT CL PREDICTED SERPL C-G-VRATE: 64 ML/MIN (ref 70–130)
GLUCOSE SERPL-MCNC: 126 MG/DL (ref 83–110)
HGB BLD-MCNC: 7 G/DL (ref 14–18)
HGB BLD-MCNC: 7.6 G/DL (ref 14–18)
MCH RBC QN AUTO: 31.3 PG (ref 27–31)
MCV RBC AUTO: 103 FL (ref 80–94)
PLATELET # BLD AUTO: 224 THOU/UL (ref 130–400)
POTASSIUM SERPL-SCNC: 4.3 MMOL/L (ref 3.5–5.1)
RBC # BLD AUTO: 2.25 MILL/UL (ref 4.7–6.1)
SODIUM SERPL-SCNC: 140 MMOL/L (ref 136–145)
VANCOMYCIN TROUGH SERPL-MCNC: 17.5 UG/ML
WBC # BLD AUTO: 21.2 THOU/UL (ref 4.8–10.8)

## 2017-12-31 RX ADMIN — MULTIPLE VITAMINS W/ MINERALS TAB SCH TAB: TAB at 09:51

## 2017-12-31 RX ADMIN — Medication SCH GM: at 13:22

## 2017-12-31 RX ADMIN — HYDROCORTISONE SODIUM SUCCINATE SCH MG: 100 INJECTION, POWDER, FOR SOLUTION INTRAMUSCULAR; INTRAVENOUS at 06:28

## 2017-12-31 RX ADMIN — DOCUSATE SODIUM 50 MG AND SENNOSIDES 8.6 MG SCH TAB: 8.6; 5 TABLET, FILM COATED ORAL at 09:50

## 2017-12-31 RX ADMIN — HYDROCODONE BITARTRATE AND ACETAMINOPHEN PRN TAB: 10; 325 TABLET ORAL at 16:19

## 2017-12-31 RX ADMIN — Medication SCH GM: at 21:27

## 2017-12-31 RX ADMIN — VANCOMYCIN HYDROCHLORIDE SCH MLS: 1 INJECTION, SOLUTION INTRAVENOUS at 06:28

## 2017-12-31 RX ADMIN — Medication PRN ML: at 21:27

## 2017-12-31 RX ADMIN — DOCUSATE SODIUM 50 MG AND SENNOSIDES 8.6 MG SCH TAB: 8.6; 5 TABLET, FILM COATED ORAL at 21:27

## 2017-12-31 RX ADMIN — HYDROCORTISONE SODIUM SUCCINATE SCH MG: 100 INJECTION, POWDER, FOR SOLUTION INTRAMUSCULAR; INTRAVENOUS at 00:25

## 2017-12-31 NOTE — PDOC.FM
- Subjective


Subjective: 





Pt seen at bedside in NAD. ANDRE overnight, tolerated OR well yesterday and notes 

pain controlled. No complaints at this time.





- Objective


MAR Reviewed: Yes


Vital Signs & Weight: 


 Vital Signs (12 hours)











  Temp Pulse Resp Pulse Ox


 


 12/31/17 06:42   62  18 


 


 12/31/17 03:00  98 F   


 


 12/30/17 23:00  98.2 F   


 


 12/30/17 19:58  97.6 F  70  15  96








 Weight











Admit Weight                   68.9 kg


 


Weight                         68.9 kg











 Most Recent Monitor Data











Heart Rate from ECG            55


 


NIBP                           116/39


 


NIBP BP-Mean                   68


 


Respiration from ECG           13


 


SpO2                           100














I&O: 


 











 12/30/17 12/31/17 01/01/18





 06:59 06:59 06:59


 


Intake Total 1506 1309 


 


Output Total 1085 835 10


 


Balance 421 474 -10











Result Diagrams: 


 12/31/17 05:25





 12/31/17 05:25





<Lucas Wiggins - Last Filed: 12/31/17 07:49>





- Objective


Vital Signs & Weight: 


 Vital Signs (12 hours)











  Temp Pulse Pulse Pulse Resp BP BP


 


 12/31/17 12:00  97.7 F      


 


 12/31/17 09:51   105 H     


 


 12/31/17 08:33    93  108 H   108/58 L  103/64


 


 12/31/17 08:00  97.8 F  105 H    22 H  


 


 12/31/17 06:42   62    18  


 


 12/31/17 03:00  98 F      














  Pulse Ox Pulse Ox Pulse Ox


 


 12/31/17 12:00   


 


 12/31/17 09:51   


 


 12/31/17 08:33   91 L  98


 


 12/31/17 08:00  95  


 


 12/31/17 06:42   


 


 12/31/17 03:00   








 Weight











Admit Weight                   68.9 kg


 


Weight                         68.9 kg











 Most Recent Monitor Data











Heart Rate from ECG            67


 


NIBP                           124/59


 


NIBP BP-Mean                   81


 


Respiration from ECG           15


 


SpO2                           100














I&O: 


 











 12/30/17 12/31/17 01/01/18





 06:59 06:59 06:59


 


Intake Total 1506 1309 


 


Output Total 1085 835 215


 


Balance 421 474 -215











Result Diagrams: 


 12/31/17 05:25





 12/31/17 05:25





<Anderson Grace - Last Filed: 12/31/17 14:04>





Phys Exam





- Physical Examination


Constitutional: NAD


HEENT: PERRLA


Respiratory: no rales, no rhonchi


scant bibasilar wheezes


Cardiovascular: RRR


Gastrointestinal: soft, non-tender


Musculoskeletal: pulses present


Neurological: moves all 4 limbs


bandage site c/d/i


Psychiatric: normal affect, A&O x 3





<Lucas Wiggins - Last Filed: 12/31/17 07:49>





Dx/Plan


(1) Septic joint


Status: Acute   


  QualifierTitle:    Septic arthritis location: hip   Septic arthritis organism

: due to unspecified organism   Laterality: right   Qualified Code(s): M00.9 - 

Pyogenic arthritis, unspecified   


Plan: 





-pt presented for worsening hip pain, swelling, and redness at incision site s/

p right hip hemiarthroplasty on 11/12/17


-XR reveals areas of inflammation surrounding hip replacement


-pt also presented with fever, leukocytosis, and hypotension initially meeting 

diagnostic criteria for septic shock


-pt was admitted to CCU


-orthopedic surgery consulted


-after aggressive IVF and addition of hydrocortisone, pt's BP stabilized and pt 

did not require pressor support


-MAP consistently above 80, continue to monitor closely


-blood and wound cultures have been drawn and show MSSA bacteremia


-pt started on rocephin and vancomycin per surgical team. vanc trough 

therapeutic at 17.5


-on 12/30 pt had removal of previous hardware and replacement with antibiotic 

spacer


-continue to monitor, plan per surgery team








(2) MSSA (methicillin susceptible Staphylococcus aureus) septicemia


Code(s): A41.01 - SEPSIS DUE TO METHICILLIN SUSCEPTIBLE STAPHYLOCOCCUS AUREUS   

Status: Acute   


Plan: 





-plan per above


-surgery has also consulted ID, recs greatly appreciated


-consider deescalating therapy








(3) GERTRUDE (acute kidney injury)


Code(s): N17.9 - ACUTE KIDNEY FAILURE, UNSPECIFIED   Status: Acute   


Plan: 





-GERTRUDE on CKD2, improved











(4) COPD (chronic obstructive pulmonary disease)


Status: Acute   


  QualifierTitle:    COPD type: emphysema   Emphysema type: panlobular   

Qualified Code(s): J43.1 - Panlobular emphysema   


Plan: 





-continue home medications and supplemental oxygen as needed








(5) Paroxysmal atrial fibrillation with RVR


Code(s): I48.0 - PAROXYSMAL ATRIAL FIBRILLATION   Status: Chronic   


Plan: 





-currently NSR


-will continue home digoxin and continue to monitor closely








(6) Peripheral neuropathy


Code(s): G62.9 - POLYNEUROPATHY, UNSPECIFIED   Status: Chronic   


  QualifierTitle:    Peripheral neuropathy type: polyneuropathy, unspecified   

Qualified Code(s): G62.9 - Polyneuropathy, unspecified   


Plan: 





-continue home gabapentin








- Plan


Plan: 





dispo: Pt stable and tolerated OR well. MSSA bacteremia, likely deescalate 

therapy. Surgery and ID recs greatly appreciated. Pt stable for transfer out of 

CCU. Continue to monitor.





<Lucas Wiggins - Last Filed: 12/31/17 07:49>





Attending Addendum





- Attending Addendum


I personally evaluated the patient in ICU and discussed the management with Dr. Wiggins. 


I agree with the History, Examination, Assessment and Plan documented above 

with any addition or exceptions noted below.


He feels well, pain is controlled and he was sitting up eating breakfast in 

chair. Afebrile.  Lungs: CTA, Cor: irreg irreg with rate 80s-90s.  Has gone in 

to A-fib with normal ventricular response. 


Ortho surgery has cleared him to go to surgical floor and that seems 

appropriate.  Will continue to follow medically .  [facility] 





<Anderson Grace - Last Filed: 12/31/17 14:04>

## 2017-12-31 NOTE — CON
DATE OF CONSULTATION:  12/31/2017

 

REASON FOR CONSULTATION:  Right hemiarthroplasty infection with bacteremia.

 

HISTORY OF PRESENT ILLNESS:  An 82-year-old who has a history of COPD, coronary artery disease with a
trial fibrillation and a recent episode of entrapped lung associated with gelatinous fluid, probably 
residual from infection secondary to Klebsiella who fell recently and broken his right hip and underw
ent hemiarthroplasty in November of this year and now has been admitted with sepsis with obvious infe
ction in the right hip.  The patient underwent removal of the implant and had placement of bipolar im
plant in the site by Dr. Hassan with antibiotic impregnated cement.  Patient had thorough debridement
 and now is in the ICU recovering from the sepsis.  He is sitting up by the bedside.  He is awake, or
iented, denies headaches, visual symptoms, sore throat, odynophagia, dysphagia, no dyspnea or chest p
ain, no back pain, no abdominal pain.  He has a Sandoval catheter in place and mild pain in the right hi
p site.

 

PAST MEDICAL HISTORY:  COPD, MI, coronary artery disease, AFib, emphysema, neuropathy, entrapped righ
t lung with empyema/gelatinous fluid managed with decortication, had air leak developing after this p
rocedure and required a Heimlich valve which was removed recently.

 

ALLERGIES:  None.

 

CURRENT MEDICATIONS:  Norco, Ventolin, DuoNeb, Cordarone, Brovana, aspirin, Lipitor, Dulcolax, Pulmic
ort, Coreg, Rocephin, Lanoxin, Benadryl, Sublimaze, Fergon, Neurontin, prednisone, Phenergan, tamsulo
sin, tramadol, zolpidem.

 

FAMILY HISTORY:  Noncontributory.

 

SOCIAL HISTORY:  Former smoker, quit 15 years prior, lives, I believe in [place] with wife.

 

PHYSICAL EXAMINATION:

VITAL SIGNS:  T-max 99.1, currently 97.8, blood pressure 103/64, pulse 70, respirations 17, O2 sat 10
0% on 2 liters.

SKIN:  With areas of seborrheic keratosis in the back, Sandoval catheter in place and peripheral IV acce
ss.  No lymphadenopathy.

HEENT:  Ocular movements are conjugate, still quite a few teeth in place with marked decay and gum di
sease.

NECK:  Supple, no jugular venous distention.

LUNGS:  With diminished breath sounds throughout the lung fields.  I feel crackles here and there.  N
o wheezing.

HEART:  S1, S2 with a soft aortic murmur.  Regular rate.

ABDOMEN:  Soft, not distended or tender, may be mildly distended, but not tender.  No ascites.  No bl
adder distention.

GENITAL:  Normal except for Sandoval catheter.

EXTREMITIES:  No other joint inflammatory process noticeable.  Pulses 1+ in dorsalis pedis.  Plantar 
responses are flexure.  Right hip without any drains at this time.  He is able to move extremities wi
th some limitation due to the right hip inflammatory process.

NEUROLOGIC:  He is awake, knows his name, knows where he is, follows commands, pleasant.

 

LABORATORY DATA:  Urinalysis was normal.  White cell count 30,000 down to 21,000, hemoglobin down to 
7, , and platelets 224 with 80% neutrophils, 13% bands.  Chemistry:  Creatinine 0.88, glucose 
126, albumin 2.6, globulin 2.3.  Microbiology with Staph aureus, methicillin susceptible.

 

ASSESSMENT:  Chronic obstructive pulmonary disease, emphysema, prior history of myocardial infarction
 with coronary artery disease, atrial fibrillation, now with a fracture followed by hemiarthroplasty 
and now infection with methicillin-susceptible Staphylococcus aureus with bacteremia and status post 
removal hemiarthroplasty and placement of a bipolar implant.

 

DISCUSSION:  The patient will be transitioned to cefazolin 2 grams q.8 hours plus rifampin and treat 
for 6 weeks after that probably continue some suppression probably Keflex, PICC line placement.  Prob
ably he will be transitioned to rehabilitation.  No other areas of dissemination noticeable at this t
ricco.  Areas to be concerned with would include spine, lungs mostly and endocardium.

## 2017-12-31 NOTE — PRG
DATE OF SERVICE:  12/31/2017

 

SUBJECTIVE:  He had a surgery yesterday did extremely well, feels okay today and has no complaints.

 

OBJECTIVE:

VITAL SIGNS:  Temperature is 98, pulse 55, blood pressure 116/39.  He is off of vasopressors, 24-hour
 intake 1309, output 835.

HEENT:  Unremarkable.

NECK:  No JVD.

CHEST:  Clear without wheezing.

CARDIAC:  S1 and S2, regular.

ABDOMEN:  Soft.

EXTREMITIES:  No edema.

 

LABORATORY DATA:  White blood cell count 21.2, hematocrit 23.3, and platelet count 224.  Sodium 140, 
potassium 4.3, chloride 110, CO2 of 26, BUN 21, creatinine 0.8, and glucose 126.

 

ASSESSMENT:

1.  Status post removal of infected hip hardware.

2.  Underlying chronic obstructive pulmonary disease, requiring steroids and oxygen.

3.  History of coronary artery disease.

4.  History of paroxysmal atrial fibrillation.

 

PLAN:

1.  Transfer to the ortho floor.

2.  Decrease IV fluids.

3.  Decrease steroid down to 15 mg per day of prednisone dose.  His baseline dose is 10 mg per day.

4.  Follow H&H tomorrow.

## 2018-01-01 LAB
BASOPHILS # BLD AUTO: 0 THOU/UL (ref 0–0.2)
BASOPHILS NFR BLD AUTO: 0 % (ref 0–1)
EOSINOPHIL # BLD AUTO: 0 THOU/UL (ref 0–0.7)
EOSINOPHIL NFR BLD AUTO: 0.1 % (ref 0–10)
HGB BLD-MCNC: 6.8 G/DL (ref 14–18)
HGB BLD-MCNC: 6.9 G/DL (ref 14–18)
HGB BLD-MCNC: 8.3 G/DL (ref 14–18)
LYMPHOCYTES # BLD: 0.8 THOU/UL (ref 1.2–3.4)
LYMPHOCYTES NFR BLD AUTO: 4.3 % (ref 21–51)
MCH RBC QN AUTO: 30.9 PG (ref 27–31)
MCH RBC QN AUTO: 31 PG (ref 27–31)
MCV RBC AUTO: 102 FL (ref 80–94)
MCV RBC AUTO: 102 FL (ref 80–94)
MONOCYTES # BLD AUTO: 0.9 THOU/UL (ref 0.11–0.59)
MONOCYTES NFR BLD AUTO: 4.8 % (ref 0–10)
NEUTROPHILS # BLD AUTO: 16.4 THOU/UL (ref 1.4–6.5)
NEUTROPHILS NFR BLD AUTO: 90.8 % (ref 42–75)
PLATELET # BLD AUTO: 251 THOU/UL (ref 130–400)
PLATELET # BLD AUTO: 254 THOU/UL (ref 130–400)
RBC # BLD AUTO: 2.21 MILL/UL (ref 4.7–6.1)
RBC # BLD AUTO: 2.22 MILL/UL (ref 4.7–6.1)
WBC # BLD AUTO: 17.5 THOU/UL (ref 4.8–10.8)
WBC # BLD AUTO: 18 THOU/UL (ref 4.8–10.8)

## 2018-01-01 PROCEDURE — 30233N1 TRANSFUSION OF NONAUTOLOGOUS RED BLOOD CELLS INTO PERIPHERAL VEIN, PERCUTANEOUS APPROACH: ICD-10-PCS | Performed by: SURGERY

## 2018-01-01 RX ADMIN — DOCUSATE SODIUM 50 MG AND SENNOSIDES 8.6 MG SCH TAB: 8.6; 5 TABLET, FILM COATED ORAL at 08:48

## 2018-01-01 RX ADMIN — Medication PRN ML: at 06:10

## 2018-01-01 RX ADMIN — HYDROCODONE BITARTRATE AND ACETAMINOPHEN PRN TAB: 10; 325 TABLET ORAL at 10:49

## 2018-01-01 RX ADMIN — Medication SCH GM: at 14:50

## 2018-01-01 RX ADMIN — Medication PRN ML: at 21:09

## 2018-01-01 RX ADMIN — MULTIPLE VITAMINS W/ MINERALS TAB SCH TAB: TAB at 08:48

## 2018-01-01 RX ADMIN — Medication SCH GM: at 21:08

## 2018-01-01 RX ADMIN — DOCUSATE SODIUM 50 MG AND SENNOSIDES 8.6 MG SCH TAB: 8.6; 5 TABLET, FILM COATED ORAL at 21:09

## 2018-01-01 RX ADMIN — HYDROCODONE BITARTRATE AND ACETAMINOPHEN PRN TAB: 10; 325 TABLET ORAL at 21:10

## 2018-01-01 RX ADMIN — Medication SCH GM: at 06:10

## 2018-01-01 NOTE — PRG
DATE OF SERVICE:  01/01/2018

 

SERVICE:  Pulmonary Medicine.

 

INTERVAL HISTORY:  Patient is breathing fine.  He indicates that his breathing is actually at baselin
e, though he does have a little bit of increased work of breathing when I watch him.  Otherwise, ther
e has been no significant change in his condition.  He denies having any significant cough or sputum 
production.  He has not had any fevers, chills, nausea, or vomiting.  He is looking forward to get mo
ving on to rehabilitation soon enough.  After this is completed, he is actually asked me about a pulm
onary rehabilitation whether or not this is an option for him.  I left that for him, discussed with NATASHA Barillas.

 

PHYSICAL EXAMINATION:

VITAL SIGNS:  Afebrile, pulse 60, blood pressure 139/65, respirations 16, saturation 98% on 2 liters 
nasal cannula.

GENERAL:  Patient is awake and alert in no apparent distress.

LUNGS:  Decreased air entry.  There is a prolonged expiratory phase with both polyphonic wheezing, an
d crackles present.  No rhonchi.

HEART:  Normal rate, regular.

ABDOMEN:  Soft, nontender, nondistended.  Bowel sounds are positive.

MUSCULOSKELETAL:  No cyanosis or clubbing.  There is a diffuse 1-2+ pitting throughout.

GENITOURINARY:  Sandoval catheter in place.

NEUROLOGIC:  Grossly nonfocal.

 

LABORATORY DATA:  Hemoglobin 8.3, WBC 18.0, platelets 254,000.  INR 1.0.  Basic metabolic profile was
 previously unremarkable.  Urinalysis is negative.  Digoxin level is below the assay limit of 0.15 pr
eviously.  Vancomycin trough 17.5.  Staph aureus is a pansensitive organism and growing in the hip cu
lture, blood culture x2.  No repeat blood cultures have been obtained.

 

ASSESSMENT:

1.  Chronic hypoxic respiratory failure.

2.  Chronic obstructive pulmonary disease.

3.  Septic arthritis secondary to methicillin-susceptible Staphylococcus aureus.

4.  Bacteremia secondary to methicillin-sensitive Staphylococcus aureus.

 

PLAN:  I will repeat blood cultures.  If these turned out positive, evaluation for disseminated infec
tion may need to be considered.  Dr. Barillas will assume care in the morning.

## 2018-01-01 NOTE — PDOC.FM
- Subjective


Subjective: 





Pt seen at bedside in NAD. ANDRE overnight. Tolerating diet well, pain 

controlled. Pt denies CP, SOB, abd pain, NVD.





- Objective


MAR Reviewed: Yes


Vital Signs & Weight: 


 Vital Signs (12 hours)











  Temp Pulse Resp BP Pulse Ox


 


 01/01/18 04:10  97.6 F  70  18  156/63 H  98


 


 01/01/18 04:05      98


 


 12/31/17 23:38  97.9 F  61  16  133/68  93 L


 


 12/31/17 20:20  97.7 F  67  20   93 L


 


 12/31/17 20:00  97.7 F  67  20  134/90  100








 Weight











Admit Weight                   68.9 kg


 


Weight                         68.9 kg











 Most Recent Monitor Data











Heart Rate from ECG            112


 


NIBP                           147/86


 


NIBP BP-Mean                   114


 


Respiration from ECG           21


 


SpO2                           100














I&O: 


 











 12/31/17 01/01/18 01/02/18





 06:59 06:59 06:59


 


Intake Total 1309 900 


 


Output Total 835 1810 


 


Balance 474 -910 











Result Diagrams: 


 01/01/18 05:11





 12/31/17 05:25





Phys Exam





- Physical Examination


Constitutional: NAD


HEENT: PERRLA


Respiratory: no rales, no rhonchi


scant bibasilar wheezes


Cardiovascular: RRR


NSR on exam


Gastrointestinal: soft, non-tender


Musculoskeletal: pulses present


Neurological: moves all 4 limbs


surgical area c/d/i


Psychiatric: normal affect, A&O x 3





Dx/Plan


(1) Septic joint


Status: Acute   


Qualifiers: 


   Septic arthritis location: hip   Septic arthritis organism: due to 

unspecified organism   Laterality: right   Qualified Code(s): M00.9 - Pyogenic 

arthritis, unspecified   


Plan: 





-pt presented for worsening hip pain, swelling, and redness at incision site s/

p right hip hemiarthroplasty on 11/12/17


-XR reveals areas of inflammation surrounding hip replacement


-pt also presented with fever, leukocytosis, and hypotension initially meeting 

diagnostic criteria for septic shock


-pt was admitted to CCU


-orthopedic surgery consulted


-after aggressive IVF and addition of hydrocortisone, pt's BP stabilized and pt 

did not require pressor support


-MAP consistently above 80, continue to monitor closely


-blood and wound cultures have been drawn and show MSSA bacteremia


-pt started on rocephin and vancomycin per surgical team. vanc trough 

therapeutic at 17.5


-on 12/30 pt had removal of previous hardware and replacement with antibiotic 

spacer


-continue to monitor, plan per surgery team








(2) MSSA (methicillin susceptible Staphylococcus aureus) septicemia


Code(s): A41.01 - SEPSIS DUE TO METHICILLIN SUSCEPTIBLE STAPHYLOCOCCUS AUREUS   

Status: Acute   


Plan: 





-surgery has also consulted ID, recs greatly appreciated


-transitioned to cefazolin and rifampin


-plan for PICC placement and IV abx for 6 weeks








(3) Macrocytic anemia


Code(s): D53.9 - NUTRITIONAL ANEMIA, UNSPECIFIED   Status: Acute   


Plan: 





-slight fall in Hgb post op with this AM 6.8


-transfuse 1 unit and follow post transfusion H/H








(4) GERTRUDE (acute kidney injury)


Code(s): N17.9 - ACUTE KIDNEY FAILURE, UNSPECIFIED   Status: Acute   


Plan: 





-GERTRUDE on CKD2, improved











(5) COPD (chronic obstructive pulmonary disease)


Status: Acute   


Qualifiers: 


   COPD type: emphysema   Emphysema type: panlobular   Qualified Code(s): J43.1 

- Panlobular emphysema   


Plan: 





-continue home medications and supplemental oxygen as needed








(6) Paroxysmal atrial fibrillation with RVR


Code(s): I48.0 - PAROXYSMAL ATRIAL FIBRILLATION   Status: Chronic   


Plan: 





-currently NSR


-will continue home medications and continue to monitor closely


-review of home meds shows no anticoagulation. ortho currently has pt on ASA 

325 BID.


-consider addition of anticoagulation prior to discharge








(7) Peripheral neuropathy


Code(s): G62.9 - POLYNEUROPATHY, UNSPECIFIED   Status: Chronic   


Qualifiers: 


   Peripheral neuropathy type: polyneuropathy, unspecified   Qualified Code(s): 

G62.9 - Polyneuropathy, unspecified   


Plan: 





-continue home gabapentin








- Plan


Plan: 





dispo: Pt stable and doing well. Abx coverage changed, ID recs greatly 

appreciated. Pt to have PICC line placed. Will transfuse 1 unit pRBC this AM 

and follow H/H. Pt will likely be transitioned to inpatient rehab vs SNF, ortho 

recs greatly appreciated. Continue to monitor.

## 2018-01-01 NOTE — PRG
DATE OF SERVICE:  01/01/2018

 

Mr. Moseley is a pleasant 82-year-old patient who was admitted with an infected hip prosthesis foll
owing hip replacement several weeks ago.  He had to have further surgery and was also seen in consult
ation by Dr. Preston.  Dr. Preston's recommendations were made for 6 weeks of intravenous antibiotic ther
apy and we appreciate his input.  In the event clinically Mr. Moseley is looking and feeling fine. 
 He is currently afebrile with normal vital signs.

## 2018-01-02 LAB
FOLATE RBC-MCNC: 1808 NG/ML (ref 498–?)
HCT VFR BLD CALC: 28.1 % (ref 37.5–51)
HGB BLD-MCNC: 7.6 G/DL (ref 14–18)
MCH RBC QN AUTO: 31.1 PG (ref 27–31)
MCV RBC AUTO: 99.4 FL (ref 80–94)
PLATELET # BLD AUTO: 213 THOU/UL (ref 130–400)
RBC # BLD AUTO: 2.46 MILL/UL (ref 4.7–6.1)
WBC # BLD AUTO: 8.8 THOU/UL (ref 4.8–10.8)

## 2018-01-02 PROCEDURE — B548ZZA ULTRASONOGRAPHY OF SUPERIOR VENA CAVA, GUIDANCE: ICD-10-PCS | Performed by: ORTHOPAEDIC SURGERY

## 2018-01-02 PROCEDURE — 02HV33Z INSERTION OF INFUSION DEVICE INTO SUPERIOR VENA CAVA, PERCUTANEOUS APPROACH: ICD-10-PCS | Performed by: FAMILY MEDICINE

## 2018-01-02 RX ADMIN — DOCUSATE SODIUM 50 MG AND SENNOSIDES 8.6 MG SCH TAB: 8.6; 5 TABLET, FILM COATED ORAL at 08:54

## 2018-01-02 RX ADMIN — Medication SCH GM: at 14:42

## 2018-01-02 RX ADMIN — HYDROCODONE BITARTRATE AND ACETAMINOPHEN PRN TAB: 10; 325 TABLET ORAL at 20:43

## 2018-01-02 RX ADMIN — Medication SCH GM: at 06:26

## 2018-01-02 RX ADMIN — HYDROCODONE BITARTRATE AND ACETAMINOPHEN PRN TAB: 10; 325 TABLET ORAL at 14:48

## 2018-01-02 RX ADMIN — Medication PRN ML: at 20:43

## 2018-01-02 RX ADMIN — DOCUSATE SODIUM 50 MG AND SENNOSIDES 8.6 MG SCH: 8.6; 5 TABLET, FILM COATED ORAL at 19:48

## 2018-01-02 RX ADMIN — Medication SCH GM: at 20:44

## 2018-01-02 RX ADMIN — MULTIPLE VITAMINS W/ MINERALS TAB SCH TAB: TAB at 08:55

## 2018-01-02 RX ADMIN — Medication PRN ML: at 06:26

## 2018-01-02 RX ADMIN — HYDROCODONE BITARTRATE AND ACETAMINOPHEN PRN TAB: 10; 325 TABLET ORAL at 06:24

## 2018-01-02 NOTE — PRG
DATE OF SERVICE:  01/02/2018

 

SUBJECTIVE:  Mr. Msoeley is afebrile, heart rate 60, respiratory rate 20, oximetry is 99 on 2 liter
s, blood pressure 133/67.

LUNGS:  Clear.

HEART:  Regular rhythm.

ABDOMEN:  Soft.

 

He has a PICC line in place in his left upper extremity.

 

IMPRESSION:

1.  Chronic obstructive pulmonary disease.

2.  Chronic hypoxic respiratory failure, on oxygen at home.

3.  Septic arthritis secondary to methicillin-sensitive Staphylococcus.

4.  Bacteremia with his arthritis.

5.  History of an empyema treated with chest tube drainage for close to a year, chest tube was slowly
 advanced and eventually fell out.  This was in his right hemithorax.

6.  History of deconditioning.

7.  History of coronary disease.

8.  History of atrial fibrillation.

 

PLAN:  Continue physical therapy, wound care and IV antibiotics.  Probably should have repeat culture
s to document clearing of his bacteremia.

## 2018-01-02 NOTE — PRG
DATE OF SERVICE:  01/02/2018

 

SUBJECTIVE:  The patient is doing well with physical therapy, had some bleeding at the incision site.
  No respiratory symptoms or abdominal pain, no diarrhea.

 

OBJECTIVE:

VITAL SIGNS:  He has been afebrile.  Blood pressure 130/67, pulse 63, respirations 16-20, O2 sat 99%.


GENERAL:  Awake, alert, and oriented.

LUNGS:  With clear breath sounds.

CARDIOVASCULAR:  S1, S2, regular rate, no murmurs.

ABDOMEN:  Soft and not distended.  Right hip incision dressing with saturation of blood indicating pe
rsistent bleeding, not much tenderness.

 

LABORATORY DATA:  White cell count down to 8.8, hemoglobin down to 7.6, platelets are 213.  Chemistry
 with a creatinine of 0.88, sodium 140, potassium 4.3.  Microbiology as noted previously.  Currently,
 he is on rifampin and cefazolin.

 

ASSESSMENT AND DISCUSSION:  Chronic obstructive pulmonary disease, emphysema, prior history of myocar
dial infarction with coronary artery disease, atrial fibrillation, fracture of the right hip followed
 by hemiarthroplasty and methicillin-susceptible Staphylococcus aureus infection, status post removal
 of the hemiarthroplasty and placement of bipolar implant.  Currently to continue cefazolin.  End tatum
e of therapy is 02/11/2018.  Weekly labs.  After that, transition to oral Keflex suppression.

## 2018-01-02 NOTE — ADD-PRG
DATE OF SERVICE:  01/02/2018

 

This is an addendum to the note of Dr. Lucas Wiggins.

 

Mr. Moseley looks fine this morning.  He is sitting in his chair, undergoing physical therapy.  We 
appreciate the recommendations of Dr. Gallegos yesterday.  He suggested further blood cultures which i
f positive, we need to consider the possibility of remote sources of infection.  I would recommend  i
f repeat blood cultures are positive we consider an echocardiogram to make certain the patient does n
ot have bacterial endocarditis.  Clinically he seems to be improving and says that he is "at about 70
%".  His vital signs this morning are stable with a blood pressure of 138/62.  His pulse rate is 52 a
nd irregularly irregular and his respirations are 18 and not labored.

## 2018-01-02 NOTE — SPC
LEFT UPPER EXTREMITY PICC PLACEMENT WITH ULTRASOUND GUIDANCE:

 

Date: 1-2-2018 

 

History: 82-year-old male with a hip infection in need of IV antibiotics. 

 

FINDINGS: 

Informed consent obtained prior to the procedure. 

 

Left antecubital fossa prepped and draped in normal sterile fashion. Skin overlying the left basilic 
vein anesthetized with 1% buffered Lidocaine and accessed with sonographic guidance. A 018 wire was a
dvanced through the needle to the IVC and retracted to the cavoatrial junction. Intravascular length 
calculated at 47 cm. Needle was removed and replaced with a peel-away sheath. PICC was cut to 47 cm a
nd advanced over the wire. Peel-away sheath and wire were removed. Tip of the catheter overlies the c
avoatrial junction. The catheter flushes well and is ready for use. 

 

Exposure Data: 

0.0 minutes. Fluoroscopic dose: 126 mGy*cm^2. 

 

IMPRESSION: 

Successful ultrasound guided left upper extremity PICC placement. 

 

POS: RENATO

## 2018-01-02 NOTE — PDOC.FM
- Subjective


Subjective: 





Pt seen at bedside in NAD. ANDRE overnight. Tolerating diet well, pain 

controlled. Pt denies CP, SOB, abd pain, NVD.





- Objective


MAR Reviewed: Yes


Vital Signs & Weight: 


 Vital Signs (12 hours)











  Temp Pulse Resp BP Pulse Ox


 


 01/02/18 06:27   59 L  16   99


 


 01/02/18 04:40  97.8 F  52 L  18  138/62  100


 


 01/02/18 04:34      97


 


 01/02/18 00:15  98.0 F  57 L  16  145/63 H  99


 


 01/01/18 20:32  97.9 F  61  18  155/71 H  99


 


 01/01/18 20:30  97.9 F  61  18   99








 Weight











Admit Weight                   68.9 kg


 


Weight                         68.9 kg











 Most Recent Monitor Data











Heart Rate from ECG            112


 


NIBP                           147/86


 


NIBP BP-Mean                   114


 


Respiration from ECG           21


 


SpO2                           100














I&O: 


 











 01/01/18 01/02/18 01/03/18





 06:59 06:59 06:59


 


Intake Total 900 1830 


 


Output Total 1810 1100 


 


Balance -910 730 











Result Diagrams: 


 01/02/18 05:20





 12/31/17 05:25





Phys Exam





- Physical Examination


Constitutional: NAD


HEENT: PERRLA


Respiratory: no rales, no rhonchi


scant bibasilar wheezes


Cardiovascular: RRR


NSR on exam


Gastrointestinal: soft, non-tender


Musculoskeletal: pulses present


Neurological: moves all 4 limbs


Psychiatric: A&O x 3


Skin: cap refill <2 seconds


Deviation from normal: surgical site c/d/i





Dx/Plan


(1) Septic joint


Status: Acute   


Qualifiers: 


   Septic arthritis location: hip   Septic arthritis organism: due to 

unspecified organism   Laterality: right   Qualified Code(s): M00.9 - Pyogenic 

arthritis, unspecified   


Plan: 





-pt presented for worsening hip pain, swelling, and redness at incision site s/

p right hip hemiarthroplasty on 11/12/17


-XR reveals areas of inflammation surrounding hip replacement


-pt also presented with fever, leukocytosis, and hypotension initially meeting 

diagnostic criteria for septic shock


-pt was admitted to CCU


-orthopedic surgery consulted


-after aggressive IVF and addition of hydrocortisone, pt's BP stabilized and pt 

did not require pressor support


-MAP consistently above 80, continue to monitor closely


-blood and wound cultures have been drawn and show MSSA bacteremia


-pt started on rocephin and vancomycin per surgical team. vanc trough 

therapeutic at 17.5


-on 12/30 pt had removal of previous hardware and replacement with antibiotic 

spacer


-continue to monitor, plan per surgery team








(2) MSSA (methicillin susceptible Staphylococcus aureus) septicemia


Code(s): A41.01 - SEPSIS DUE TO METHICILLIN SUSCEPTIBLE STAPHYLOCOCCUS AUREUS   

Status: Acute   


Plan: 





-surgery has also consulted ID, recs greatly appreciated


-transitioned to cefazolin and rifampin


-plan for PICC placement and IV abx for 6 weeks








(3) Macrocytic anemia


Code(s): D53.9 - NUTRITIONAL ANEMIA, UNSPECIFIED   Status: Acute   


Plan: 





-slight fall in Hgb post op


-transfused 1 unit on 1/1 and hgb responded well


-B12 normal, folate pending


-continue to monitor











(4) GERTRUDE (acute kidney injury)


Code(s): N17.9 - ACUTE KIDNEY FAILURE, UNSPECIFIED   Status: Acute   


Plan: 





-GERTRUDE on CKD2, improved











(5) COPD (chronic obstructive pulmonary disease)


Status: Acute   


Qualifiers: 


   COPD type: emphysema   Emphysema type: panlobular   Qualified Code(s): J43.1 

- Panlobular emphysema   


Plan: 





-continue home medications and supplemental oxygen as needed








(6) Paroxysmal atrial fibrillation with RVR


Code(s): I48.0 - PAROXYSMAL ATRIAL FIBRILLATION   Status: Chronic   


Plan: 





-currently NSR


-will continue home medications and continue to monitor closely


-review of home meds shows no anticoagulation. ortho currently has pt on ASA 

325 BID.


-with hasbled score of 3, pt high risk for anticoagulation, likely cont with 

ASA at discharge








(7) Peripheral neuropathy


Code(s): G62.9 - POLYNEUROPATHY, UNSPECIFIED   Status: Chronic   


Qualifiers: 


   Peripheral neuropathy type: polyneuropathy, unspecified   Qualified Code(s): 

G62.9 - Polyneuropathy, unspecified   


Plan: 





-continue home gabapentin








- Plan


Plan: 





dispo: Pt stable and doing well. Continue IV abx. PICC line placement. 

Specialist recs greatly appreciated. Likely SNF placement with need for long 

term abx and continued PT. Continue to monitor.

## 2018-01-03 VITALS — SYSTOLIC BLOOD PRESSURE: 164 MMHG | TEMPERATURE: 97.9 F | DIASTOLIC BLOOD PRESSURE: 71 MMHG

## 2018-01-03 LAB
HGB BLD-MCNC: 7.5 G/DL (ref 14–18)
MCH RBC QN AUTO: 31.4 PG (ref 27–31)
MCV RBC AUTO: 100 FL (ref 80–94)
PLATELET # BLD AUTO: 224 THOU/UL (ref 130–400)
RBC # BLD AUTO: 2.37 MILL/UL (ref 4.7–6.1)
WBC # BLD AUTO: 7.2 THOU/UL (ref 4.8–10.8)

## 2018-01-03 RX ADMIN — HYDROCODONE BITARTRATE AND ACETAMINOPHEN PRN TAB: 10; 325 TABLET ORAL at 08:34

## 2018-01-03 RX ADMIN — Medication SCH GM: at 14:27

## 2018-01-03 RX ADMIN — DOCUSATE SODIUM 50 MG AND SENNOSIDES 8.6 MG SCH: 8.6; 5 TABLET, FILM COATED ORAL at 11:02

## 2018-01-03 RX ADMIN — MULTIPLE VITAMINS W/ MINERALS TAB SCH TAB: TAB at 08:34

## 2018-01-03 RX ADMIN — Medication SCH GM: at 05:43

## 2018-01-03 RX ADMIN — Medication PRN ML: at 05:43

## 2018-01-03 RX ADMIN — HYDROCODONE BITARTRATE AND ACETAMINOPHEN PRN TAB: 10; 325 TABLET ORAL at 01:02

## 2018-01-03 RX ADMIN — HYDROCODONE BITARTRATE AND ACETAMINOPHEN PRN TAB: 10; 325 TABLET ORAL at 14:30

## 2018-01-03 NOTE — ADD-PRG
DATE OF SERVICE:  01/03/2018

 

This is an addendum to the note of Dr. Lucas Wiggins.

 

Mr. Moseley is resting quietly this morning in no distress.  He is afebrile with normal vital signs
.  His hemoglobin has been mostly stable since the 1st drawing from 8.3 to 7.6, today it is 7.5.  Harris
ab has been established and he will be discharged perhaps later today or tomorrow to continue antibio
tic therapy for 6 weeks.  So far his repeat blood cultures are negative, but these will need to be mo
nitored.

## 2018-01-03 NOTE — DIS-2
DATE OF ADMISSION:  12/29/2017

 

DATE OF DISCHARGE:  01/03/2018

 

RESIDENT:  Dr. Lucas Wiggins.

 

ADMITTING ATTENDING:  Dr. Anderson Grace.

 

DISCHARGE ATTENDING:  Dr. Frank Craven.

 

CONSULTS:

1.  Orthopedic Surgery, Dr. Jermaine Ross and Dr. Robert Hassan.

2.  Pulmonology, Dr. Randall Rodrigues.

3.  Infectious Disease, Dr. Scar Preston.

4.  PT and OT.

5.  Case management.

 

PROCEDURES:

1.  Removal of hardware and conversion of previous hip surgery to total hip 
arthroplasty using high dose antibiotic impregnated cement by Dr. Robert Hassan 
on 12/30/2017.

2.  Chest x-ray performed on 12/29/2017 showed stable appearing pleural and 
parenchymal opacity in the right base with some volume loss with biapical 
pleural thickening, no evidence of confluent process or overt edema.

3.  Hip x-ray performed on 12/29/2017 showed increased soft tissue fullness 
over the right thigh.

4.  Pelvic x-ray performed on 12/29/2017 showed a stable exam.

5.  Repeat hip x-ray on 12/30/2017 showed postop changes of the right hip 
hemiarthroplasty in good position and alignment with a femoral head prosthesis.

6.  Left upper extremity PICC placement with ultrasound guidance performed on 01
/02/2018.

 

PRIMARY DIAGNOSES:

1.  Methicillin-susceptible Staphylococcus aureus bacteremia, secondary to 
septic arthritis, status post hardware replacement and antibiotic cement spacer.

2.  Septic shock secondary to #1, resolved.

3.  Acute kidney injury secondary to #1, resolved.

4.  Chronic kidney disease, stage 2.

5.  Chronic obstructive pulmonary disease.

6.  History of coronary artery disease.

7.  Paroxysmal atrial fibrillation.

8.  Macrocytic anemia likely secondary to chronic disease and postoperative 
changes.

 

DISCHARGE MEDICATIONS:

1.  Rifampin 300 mg p.o. b.i.d. until 02/11/2018.

2.  Ancef 2 grams IV q.8 hours until 02/11/2018.

3.  Temazepam 15 mg p.o. at bedtime p.r.n.

4.  Tamsulosin 0.4 mg p.o. at bedtime.

5.  Prednisone 10 mg p.o. daily.

6.  Docusate 100 mg p.o. daily.

7.  Amiodarone 200 mg p.o. b.i.d.

8.  Atorvastatin 40 mg p.o. at bedtime.

9.  Pulmicort 2 puffs inhaled b.i.d.

10.  Digoxin 125 mcg p.o. daily.

11.  Vitamin D3 of 5000 units p.o. daily.

12.  Ferrous sulfate 325 mg p.o. b.i.d.

13.  Tramadol 50 mg p.o. b.i.d. p.r.n.

14.  Ventolin 2 puffs nebulized q.i.d. p.r.n.

15.  DuoNebs t.i.d. p.r.n.

16.  Folic acid 2 tabs p.o. b.i.d.

17.  Aspirin 325 mg p.o. daily.

18.  Norco 10/325 q.4 hours p.r.n.

19.  Melatonin 3 mg p.o. at bedtime p.r.n.

20.  Carvedilol 3.125 mg p.o. b.i.d.

21.  Gabapentin 600 mg p.o. t.i.d.

 

HISTORY OF PRESENT ILLNESS AND HOSPITAL COURSE:  The patient is a very pleasant 
82-year-old  male who initially presented with complaints of right hip 
pain.  The patient was noted to have a right hemiarthroplasty performed by Dr. Hassan in mid November and returned with evidence of a joint infection.  The 
patient was evaluated by Orthopedic Surgery and was taken back for incision and 
drainage and ultimately replacement of the hardware.  On initial admission, the 
patient was considered in septic shock with hypotension despite adequate fluid 
resuscitation.  This resolved quickly with minimal doses of hydrocortisone and 
the patient's blood pressure was stable throughout the rest of his 
hospitalization.  After surgical revision of his septic joint, the patient 
began responding extremely well with physical therapy.  The patient was stable 
for transfer out of the critical care unit one day postoperatively.  Ultimately
, the decision was made that the patient would benefit from continued 
rehabilitation and was therefore transitioned to inpatient rehabilitation.

 

Regarding the patient's wound and blood cultures, he was found to have both an 
MSSA wound infection and MSSA bacteremia.  At this time, Dr. Scar Preston with 
Infectious Disease was consulted, who recommended 6 weeks of Ancef and rifampin 
with subsequent Keflex suppressive therapy thereafter.  Therefore, the patient 
had a PICC line placed prior to discharge for long-term IV antibiotic use.

 

Regarding the patient's macrocytic anemia, a workup was completed which 
revealed a normal folate and B12 level.  It was thought that this was likely 
secondary to chronic disease and postoperative changes.  The patient did 
require 1 unit of packed red blood cells transfused on 01/01/2018.  His 
hemoglobin prior to discharge was stable at 7.5.  Discussion was had with 
multiple physicians as to potential anticoagulation with the patient's history 
of paroxysmal atrial fibrillation.  It was thought that due to the patient's 
significant bleeding risk with a HAS-BLED score of 3, the patient would benefit 
from continued aspirin use and holding of any further anticoagulation.  The 
patient's hospital course was otherwise uncomplicated.

 

DISPOSITION:  Stable.

 

DISCHARGE INSTRUCTIONS:

1.  Location:  [facility].

2.  Activity:  As tolerated with physical and occupational therapy.

3.  Diet:  Heart healthy diet.

4.  Followup:  The patient was instructed to follow up with his primary care 
physician, Dr. Toro Orr.  The patient was also instructed to follow up 
with Dr. Robert Hassan and Dr. Scar Preston for continued treatment of his hip 
prosthesis and bacteremia.

 

Greater than 30 minutes was spent preparing this discharge.

 

[facility]

## 2018-01-03 NOTE — PRG
DATE OF SERVICE:  01/03/2018

 

Mr. Moseley has no complaints.  He says he is feeling better.  He says he sat up for several hours 
yesterday. 

 

His lungs are free of wheezes. 

 

IMPRESSION:

1.  Chronic obstructive pulmonary disease, clinically stable.

2.  Methicillin susceptible Staphylococcus bacteremia.  Follow up blood cultures from the 2nd are neg
ative so far.

 

PLAN:  Continue IV antibiotics per Infectious Disease and wound care per Orthopedic Surgery.

## 2018-01-03 NOTE — PDOC.FM
- Subjective


Subjective: 





Pt seen at bedside in NAD. ANDRE overnight. Pt went for PICC placement yesterday. 

Tolerating diet well, pain controlled. Pt denies CP, SOB, abd pain, NVD. Nurse 

notes some increased drainage from right hip wound bandage.





- Objective


MAR Reviewed: Yes


Vital Signs & Weight: 


 Vital Signs (12 hours)











  Temp Pulse Resp BP Pulse Ox


 


 01/03/18 07:20  98.0 F  65  16  124/61  97


 


 01/03/18 04:00  98.0 F  56 L  19  146/69 H  100


 


 01/03/18 00:00  97.8 F  59 L  19  156/70 H  100


 


 01/02/18 20:30  98.0 F  61  19   100


 


 01/02/18 20:21      99








 Weight











Admit Weight                   68.9 kg


 


Weight                         68.9 kg











 Most Recent Monitor Data











Heart Rate from ECG            112


 


NIBP                           147/86


 


NIBP BP-Mean                   114


 


Respiration from ECG           21


 


SpO2                           100














I&O: 


 











 01/02/18 01/03/18 01/04/18





 06:59 06:59 06:59


 


Intake Total 1830 800 


 


Output Total 1100 600 


 


Balance 730 200 











Result Diagrams: 


 01/03/18 04:54





 12/31/17 05:25





Phys Exam





- Physical Examination


Constitutional: NAD


HEENT: PERRLA


Respiratory: no rales, no rhonchi, clear to auscultation bilateral


Cardiovascular: RRR


Gastrointestinal: soft, non-tender


Neurological: moves all 4 limbs


Psychiatric: A&O x 3


Skin: cap refill <2 seconds





Dx/Plan


(1) Septic joint


Status: Acute   


Qualifiers: 


   Septic arthritis location: hip   Septic arthritis organism: due to 

unspecified organism   Laterality: right   Qualified Code(s): M00.9 - Pyogenic 

arthritis, unspecified   


Plan: 





-pt presented for worsening hip pain, swelling, and redness at incision site s/

p right hip hemiarthroplasty on 11/12/17


-XR reveals areas of inflammation surrounding hip replacement


-pt also presented with fever, leukocytosis, and hypotension initially meeting 

diagnostic criteria for septic shock


-pt was admitted to CCU


-orthopedic surgery consulted


-after aggressive IVF and addition of hydrocortisone, pt's BP stabilized and pt 

did not require pressor support


-MAP consistently above 80, continue to monitor closely


-blood and wound cultures have been drawn and show MSSA bacteremia


-pt started on rocephin and vancomycin per surgical team. vanc trough 

therapeutic at 17.5


-on 12/30 pt had removal of previous hardware and replacement with antibiotic 

spacer


-continue to monitor, plan per surgery team








(2) MSSA (methicillin susceptible Staphylococcus aureus) septicemia


Code(s): A41.01 - SEPSIS DUE TO METHICILLIN SUSCEPTIBLE STAPHYLOCOCCUS AUREUS   

Status: Acute   


Plan: 





-surgery has also consulted ID, recs greatly appreciated


-transitioned to cefazolin and rifampin


-PICC placement and IV abx for 6 weeks


-repeat blood cultures drawn on 1/2 thusfar show no growth








(3) Macrocytic anemia


Code(s): D53.9 - NUTRITIONAL ANEMIA, UNSPECIFIED   Status: Acute   


Plan: 





-slight fall in Hgb post op


-transfused 1 unit on 1/1 and hgb responded well


-B12 normal, folate normal


-continue to monitor











(4) GERTRUDE (acute kidney injury)


Code(s): N17.9 - ACUTE KIDNEY FAILURE, UNSPECIFIED   Status: Acute   


Plan: 





-GERTRUDE on CKD2, improved











(5) COPD (chronic obstructive pulmonary disease)


Status: Acute   


Qualifiers: 


   COPD type: emphysema   Emphysema type: panlobular   Qualified Code(s): J43.1 

- Panlobular emphysema   


Plan: 





-continue home medications and supplemental oxygen as needed








(6) Paroxysmal atrial fibrillation with RVR


Code(s): I48.0 - PAROXYSMAL ATRIAL FIBRILLATION   Status: Chronic   


Plan: 





-currently NSR


-will continue home medications and continue to monitor closely


-review of home meds shows no anticoagulation. ortho currently has pt on ASA 

325 BID.


-with hasbled score of 3, pt high risk for anticoagulation, likely cont with 

ASA at discharge








(7) Peripheral neuropathy


Code(s): G62.9 - POLYNEUROPATHY, UNSPECIFIED   Status: Chronic   


Qualifiers: 


   Peripheral neuropathy type: polyneuropathy, unspecified   Qualified Code(s): 

G62.9 - Polyneuropathy, unspecified   


Plan: 





-continue home gabapentin








- Plan


Plan: 





dispo: Pt stable and doing well. Continue IV abx. PICC line placement. 

Specialist recs greatly appreciated. Likely SNF/rehab placement with need for 

long term abx and continued PT. If pt accepted today, likely discharge. 

Continue to monitor.

## 2018-01-18 NOTE — EKG
Test Reason : DIANGOSING PURPOSES

Blood Pressure : ***/*** mmHG

Vent. Rate : 068 BPM     Atrial Rate : 068 BPM

   P-R Int : 188 ms          QRS Dur : 082 ms

    QT Int : 342 ms       P-R-T Axes : 077 035 063 degrees

   QTc Int : 363 ms

 

Normal sinus rhythm

Nonspecific T wave abnormality

Abnormal ECG

 

Confirmed by QUINCY BUCHANAN ([id number]),  JORDAN SALINAS ([id number]) on 1/18/2018 3:24:46 PM

 

Referred By:             Confirmed By:QUINCY BUCHANAN

## 2018-01-26 NOTE — ADD-CON
ADDENDUM

 

To the consultation that was originally dictated on 12/03/2017.

 

PLAN:  We will be giving the patient nebulizer therapy treatments 3 times daily.  Again, his chest x-
ray film has been reviewed personally by myself as well as the report and the findings are as noted i
n the laboratory data section in the report.

## 2018-10-02 ENCOUNTER — HOSPITAL ENCOUNTER (OUTPATIENT)
Dept: HOSPITAL 92 - RAD | Age: 83
Discharge: HOME | End: 2018-10-02
Attending: INTERNAL MEDICINE
Payer: MEDICARE

## 2018-10-02 DIAGNOSIS — J43.9: ICD-10-CM

## 2018-10-02 DIAGNOSIS — R06.00: Primary | ICD-10-CM

## 2018-10-02 DIAGNOSIS — J98.4: ICD-10-CM

## 2018-10-02 PROCEDURE — 71046 X-RAY EXAM CHEST 2 VIEWS: CPT

## 2018-10-02 NOTE — RAD
RADIOGRAPH CHEST 2 VIEWS:

 

Date: 10-2-18

Time: 11:19 A.M.

 

HISTORY: 

83-year-old male with dyspnea.  

 

COMPARISON: 

12-29-17

 

FINDINGS:

Hyperinflation consistent with COPD. Chronic right pleurodiaphragmatic meniscus, which could be a chr
onic right pleural effusion or pleural thickening. Adjacent mild pulmonary density in the right lower
 lobe adjacent to this was shown by CT of 8-13-15 to have a configuration suggestive of chronic round
 atelectasis. No pleural effusion on the left side. No pulmonary edema or cardiomegaly. Loop recorder
 is again noted, implanted in the subcutaneous soft tissues superficial to the left anterior chest wa
ll. No pneumothorax. Mild pulmonary scar in the periphery of the left upper lobe. No interval change 
overall. 

 

IMPRESSION:

1. No acute findings. 

2. Chronic right basilar pleural thickening or small pleural effusion. 

3. Chronic pulmonary density at right lower lobe. 

4. Emphysema. 

 

NAVNEET 

 

POS: RENATO

## 2019-09-29 ENCOUNTER — HOSPITAL ENCOUNTER (INPATIENT)
Dept: HOSPITAL 92 - ERS | Age: 84
LOS: 9 days | Discharge: NURSING, REHAB OR LONG-TERM CARE | DRG: 682 | End: 2019-10-08
Attending: INTERNAL MEDICINE | Admitting: INTERNAL MEDICINE
Payer: MEDICARE

## 2019-09-29 VITALS — BODY MASS INDEX: 19.3 KG/M2

## 2019-09-29 DIAGNOSIS — I25.2: ICD-10-CM

## 2019-09-29 DIAGNOSIS — Z99.81: ICD-10-CM

## 2019-09-29 DIAGNOSIS — S22.43XA: ICD-10-CM

## 2019-09-29 DIAGNOSIS — F32.9: ICD-10-CM

## 2019-09-29 DIAGNOSIS — I21.A1: ICD-10-CM

## 2019-09-29 DIAGNOSIS — G62.9: ICD-10-CM

## 2019-09-29 DIAGNOSIS — E03.9: ICD-10-CM

## 2019-09-29 DIAGNOSIS — J96.10: ICD-10-CM

## 2019-09-29 DIAGNOSIS — I50.32: ICD-10-CM

## 2019-09-29 DIAGNOSIS — D63.1: ICD-10-CM

## 2019-09-29 DIAGNOSIS — J43.1: ICD-10-CM
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
## 2019-09-29 DIAGNOSIS — I95.9: ICD-10-CM

## 2019-09-29 DIAGNOSIS — Z79.52: ICD-10-CM

## 2019-09-29 DIAGNOSIS — D53.9: ICD-10-CM

## 2019-09-29 DIAGNOSIS — E86.9: ICD-10-CM

## 2019-09-29 DIAGNOSIS — I25.10: ICD-10-CM

## 2019-09-29 DIAGNOSIS — R29.6: ICD-10-CM

## 2019-09-29 DIAGNOSIS — E86.0: ICD-10-CM

## 2019-09-29 DIAGNOSIS — N18.3: ICD-10-CM

## 2019-09-29 DIAGNOSIS — I48.0: ICD-10-CM

## 2019-09-29 DIAGNOSIS — I13.0: ICD-10-CM

## 2019-09-29 DIAGNOSIS — Z79.899: ICD-10-CM

## 2019-09-29 DIAGNOSIS — N40.0: ICD-10-CM

## 2019-09-29 DIAGNOSIS — E87.5: ICD-10-CM

## 2019-09-29 DIAGNOSIS — E88.09: ICD-10-CM

## 2019-09-29 DIAGNOSIS — N17.0: Primary | ICD-10-CM

## 2019-09-29 DIAGNOSIS — Z90.49: ICD-10-CM

## 2019-09-29 DIAGNOSIS — E55.9: ICD-10-CM

## 2019-09-29 DIAGNOSIS — Z79.82: ICD-10-CM

## 2019-09-29 DIAGNOSIS — F17.220: ICD-10-CM

## 2019-09-29 PROCEDURE — 83540 ASSAY OF IRON: CPT

## 2019-09-29 PROCEDURE — 82570 ASSAY OF URINE CREATININE: CPT

## 2019-09-29 PROCEDURE — 76770 US EXAM ABDO BACK WALL COMP: CPT

## 2019-09-29 PROCEDURE — P9047 ALBUMIN (HUMAN), 25%, 50ML: HCPCS

## 2019-09-29 PROCEDURE — 93306 TTE W/DOPPLER COMPLETE: CPT

## 2019-09-29 PROCEDURE — 83735 ASSAY OF MAGNESIUM: CPT

## 2019-09-29 PROCEDURE — 86901 BLOOD TYPING SEROLOGIC RH(D): CPT

## 2019-09-29 PROCEDURE — 82728 ASSAY OF FERRITIN: CPT

## 2019-09-29 PROCEDURE — 36416 COLLJ CAPILLARY BLOOD SPEC: CPT

## 2019-09-29 PROCEDURE — 84443 ASSAY THYROID STIM HORMONE: CPT

## 2019-09-29 PROCEDURE — 85007 BL SMEAR W/DIFF WBC COUNT: CPT

## 2019-09-29 PROCEDURE — 82746 ASSAY OF FOLIC ACID SERUM: CPT

## 2019-09-29 PROCEDURE — 96361 HYDRATE IV INFUSION ADD-ON: CPT

## 2019-09-29 PROCEDURE — 84540 ASSAY OF URINE/UREA-N: CPT

## 2019-09-29 PROCEDURE — 96372 THER/PROPH/DIAG INJ SC/IM: CPT

## 2019-09-29 PROCEDURE — 84439 ASSAY OF FREE THYROXINE: CPT

## 2019-09-29 PROCEDURE — 94640 AIRWAY INHALATION TREATMENT: CPT

## 2019-09-29 PROCEDURE — 82607 VITAMIN B-12: CPT

## 2019-09-29 PROCEDURE — 36430 TRANSFUSION BLD/BLD COMPNT: CPT

## 2019-09-29 PROCEDURE — 83970 ASSAY OF PARATHORMONE: CPT

## 2019-09-29 PROCEDURE — 84484 ASSAY OF TROPONIN QUANT: CPT

## 2019-09-29 PROCEDURE — 85025 COMPLETE CBC W/AUTO DIFF WBC: CPT

## 2019-09-29 PROCEDURE — 96360 HYDRATION IV INFUSION INIT: CPT

## 2019-09-29 PROCEDURE — 87086 URINE CULTURE/COLONY COUNT: CPT

## 2019-09-29 PROCEDURE — 84156 ASSAY OF PROTEIN URINE: CPT

## 2019-09-29 PROCEDURE — S0028 INJECTION, FAMOTIDINE, 20 MG: HCPCS

## 2019-09-29 PROCEDURE — 93005 ELECTROCARDIOGRAM TRACING: CPT

## 2019-09-29 PROCEDURE — 76705 ECHO EXAM OF ABDOMEN: CPT

## 2019-09-29 PROCEDURE — 86850 RBC ANTIBODY SCREEN: CPT

## 2019-09-29 PROCEDURE — 70450 CT HEAD/BRAIN W/O DYE: CPT

## 2019-09-29 PROCEDURE — 83550 IRON BINDING TEST: CPT

## 2019-09-29 PROCEDURE — 80048 BASIC METABOLIC PNL TOTAL CA: CPT

## 2019-09-29 PROCEDURE — 71045 X-RAY EXAM CHEST 1 VIEW: CPT

## 2019-09-29 PROCEDURE — 82553 CREATINE MB FRACTION: CPT

## 2019-09-29 PROCEDURE — 85027 COMPLETE CBC AUTOMATED: CPT

## 2019-09-29 PROCEDURE — 80069 RENAL FUNCTION PANEL: CPT

## 2019-09-29 PROCEDURE — 80053 COMPREHEN METABOLIC PANEL: CPT

## 2019-09-29 PROCEDURE — 36415 COLL VENOUS BLD VENIPUNCTURE: CPT

## 2019-09-29 PROCEDURE — 82306 VITAMIN D 25 HYDROXY: CPT

## 2019-09-29 PROCEDURE — 80162 ASSAY OF DIGOXIN TOTAL: CPT

## 2019-09-29 PROCEDURE — 84300 ASSAY OF URINE SODIUM: CPT

## 2019-09-29 PROCEDURE — 82550 ASSAY OF CK (CPK): CPT

## 2019-09-29 PROCEDURE — 71046 X-RAY EXAM CHEST 2 VIEWS: CPT

## 2019-09-29 PROCEDURE — 81015 MICROSCOPIC EXAM OF URINE: CPT

## 2019-09-29 PROCEDURE — 84100 ASSAY OF PHOSPHORUS: CPT

## 2019-09-29 PROCEDURE — 93010 ELECTROCARDIOGRAM REPORT: CPT

## 2019-09-29 PROCEDURE — 86900 BLOOD TYPING SEROLOGIC ABO: CPT

## 2019-09-29 PROCEDURE — 81003 URINALYSIS AUTO W/O SCOPE: CPT

## 2019-09-29 PROCEDURE — P9016 RBC LEUKOCYTES REDUCED: HCPCS

## 2019-09-30 LAB
ALBUMIN SERPL BCG-MCNC: 3.9 G/DL (ref 3.4–4.8)
ALP SERPL-CCNC: 124 U/L (ref 40–110)
ALT SERPL W P-5'-P-CCNC: 102 U/L (ref 8–55)
ANION GAP SERPL CALC-SCNC: 17 MMOL/L (ref 10–20)
AST SERPL-CCNC: 62 U/L (ref 5–34)
BACTERIA UR QL AUTO: (no result) HPF
BASOPHILS # BLD AUTO: 0.1 THOU/UL (ref 0–0.2)
BASOPHILS NFR BLD AUTO: 0.4 % (ref 0–1)
BILIRUB SERPL-MCNC: 1.4 MG/DL (ref 0.2–1.2)
BUN SERPL-MCNC: 28 MG/DL (ref 8.4–25.7)
CALCIUM SERPL-MCNC: 9.4 MG/DL (ref 7.8–10.44)
CHLORIDE SERPL-SCNC: 97 MMOL/L (ref 98–107)
CK MB SERPL-MCNC: 4.8 NG/ML (ref 0–6.6)
CO2 SERPL-SCNC: 26 MMOL/L (ref 23–31)
CREAT CL PREDICTED SERPL C-G-VRATE: 0 ML/MIN (ref 70–130)
DIGOXIN SERPL-MCNC: (no result) NG/ML (ref 0.8–2)
EOSINOPHIL # BLD AUTO: 0.1 THOU/UL (ref 0–0.7)
EOSINOPHIL NFR BLD AUTO: 0.3 % (ref 0–10)
GLOBULIN SER CALC-MCNC: 2.6 G/DL (ref 2.4–3.5)
GLUCOSE SERPL-MCNC: 105 MG/DL (ref 83–110)
GLUCOSE UR STRIP-MCNC: 100 MG/DL
HGB BLD-MCNC: 11.8 G/DL (ref 14–18)
LEUKOCYTE ESTERASE UR QL STRIP.AUTO: 25 LEU/UL
LYMPHOCYTES # BLD: 2.1 THOU/UL (ref 1.2–3.4)
LYMPHOCYTES NFR BLD AUTO: 11.9 % (ref 21–51)
MCH RBC QN AUTO: 33.2 PG (ref 27–31)
MCV RBC AUTO: 101 FL (ref 78–98)
MONOCYTES # BLD AUTO: 1.4 THOU/UL (ref 0.11–0.59)
MONOCYTES NFR BLD AUTO: 7.8 % (ref 0–10)
NEUTROPHILS # BLD AUTO: 14.2 THOU/UL (ref 1.4–6.5)
NEUTROPHILS NFR BLD AUTO: 79.7 % (ref 42–75)
PLATELET # BLD AUTO: 186 THOU/UL (ref 130–400)
POTASSIUM SERPL-SCNC: 3.2 MMOL/L (ref 3.5–5.1)
PROT UR STRIP.AUTO-MCNC: 300 MG/DL
RBC # BLD AUTO: 3.55 MILL/UL (ref 4.7–6.1)
SODIUM SERPL-SCNC: 137 MMOL/L (ref 136–145)
TROPONIN I SERPL DL<=0.01 NG/ML-MCNC: 0.2 NG/ML (ref ?–0.03)
TROPONIN I SERPL DL<=0.01 NG/ML-MCNC: 0.21 NG/ML (ref ?–0.03)
WBC # BLD AUTO: 17.8 THOU/UL (ref 4.8–10.8)
WBC UR QL AUTO: (no result) HPF (ref 0–3)

## 2019-09-30 NOTE — CT
PRELIMINARY REPORT/[facility]/EMERGENCY AFTER

HOURS PROCEDURE: 

 

PROCEDURE INFORMATION:

Exam: CT Head without contrast

 

Exam date and time: 9/30/2019 12:22 AM

 

Clinical history: 84 years old, male; Altered mental status/memory loss; Patient HX: Progressive weak
ness for the past year, worsening in the past few months with inability to complete adls, fallx2 toda
y back into his recliner upon standing. Denies syncope, palpitations, chest pain, or focal

weakness.

 

TECHNIQUE:

Imaging protocol: Computed tomography of the head without contrast.

 

COMPARISON:

No relevant prior studies available.

 

FINDINGS:

Brain: There is no acute intracranial hemorrhage, mass effect or midline shift. No large acute territ
orial infarct identified. There are patchy regions of hypodensity in the periventricular and subcorti
elodia white matter, likely on the basis of chronic microvascular ischemic disease. A small

remote lacunar infarct is seen in the left putamen.

Ventricles: The ventricles and sulci are prominent in size, which is likely related to global cerebra
l volume loss.

Bones/joints: Unremarkable. No acute fracture.

Sinuses: Visualized sinuses are unremarkable. No fluid levels.

Mastoid air cells: Visualized mastoid air cells are well aerated.

Soft tissues: Unremarkable.

Vasculature: There are extensive atherosclerotic calcifications at the bilateral carotid siphons.

 

 

IMPRESSION:

No acute intracranial hemorrhage, mass effect or midline shift.

 

Thank you for allowing us to participate in the care of your patient.

Dictated and Authenticated by: Tameka Ochoa MD

09/30/2019 12:31 AM Central Time ([place] & [place])

 

 

 

FINAL REPORT 

 

EMERGENCY AFTER HOURS CT BRAIN WITHOUT CONTRAST:

 

Date:  09/30/19 

 

FINDINGS/IMPRESSION: 

I agree with the findings and impression given in the preliminary report per [facility] physician. No evide
nce of acute intracranial abnormality. 

 

 

POS: CET

## 2019-09-30 NOTE — ULT
RIGHT UPPER QUADRNAT ABDOMINAL ULTRASOUND:

 

HISTORY: 

Right upper quadrant abdominal pain and elevated LFTs.

 

TECHNIQUE: 

Multiplanar, gray scale, and color Doppler images were obtained in a right upper quadrant abdominal u
ltrasound.

 

FINDINGS: 

The liver is normal in echogenicity without focal lesions or intrahepatic ductal dilatation.  The gal
lbladder contains small stones.  There is no gallbladder thickening or pericholecystic fluid.  The co
mmon bile duct is normal measuring 5 mm.

 

The visualized portions of the pancreas are unremarkable.  There is limited visualization of the righ
t kidney which measures 10.9 cm in length.  No obvious hydronephrosis or calculi are seen in the righ
t kidney.

 

IMPRESSION: 

Cholelithiasis.

 

POS: CET

## 2019-09-30 NOTE — PDOC.HHP
Hospitalist HPI





- History of Present Illness


Weakness


History of Present Illness: 


Patient is 84M with PMH o2 dependant COPD, presents to ED with complaint of 

weakness, multiple falls "sitting around too long" and states he lost all of 

his muscles.  reports progressive weakness for the past year, worsening in the 

past few months with inability to complete ADLs, fallx2 today back into his 

recliner upon standing. denies syncope, palpitations, chest pain, or focal 

weakness.





discussed with Dr Rubio of EM, patient recovered will after rehab this 

february could benefit from rehab/SNF, recommended patient for admission for 

social issues and debility





Hospitalist ROS





- Review of Systems


Constitutional: reports: weakness.  denies: fever, chills, sweats, malaise, 

other


Eyes: denies: pain, vision change, conjunctivae inflammation, eyelid 

inflammation, redness, other


Cardiovascular: denies: chest pain, palpitations, orthopnea, paroxysmal noc. 

dyspnea, edema, light headedness, other


Gastrointestinal: denies: nausea, vomiting, abdominal pain, diarrhea, 

constipation, melena, hematochezia, other


Genitourinary: denies: dysuria, frequency, incontinence, hematuria, retention, 

other


Musculoskeletal: reports: other (weakness, multiple falls)


Neurological: denies: weakness, numbness, incoordination, change in speech, 

confusion, seizures, other (no focaol deficits)


All other systems reviewed; all pertinent +/- noted in HPI/Subj





- Medication


Medications: 





aspirin  TABLET : Strength - 81 mg : ORAL


Patient Dose: 81 mg Oral once a day. 





gabapentin  TABLET : Strength - 600 mg : ORAL


Patient Dose: 600 mg Oral 3 times a day. 





tamsulosin  CAPSULE, EXT RELEASE 24 HR : Strength - 0.4 mg : ORAL


Patient Dose: 0.4 mg Oral once a day. 





predniSONE  TABLET : Strength - 10 mg : ORAL


Patient Dose: 10 mg Oral once a day. 





ipratropium-albuterol  AMPUL FOR NEBULIZATION (ML) : Strength - 0.5 mg-3 mg (

2.5 mg base)/3 mL : INHALATION


Patient Dose: 1 inhalation Nebulize 3 times a day. 





temazepam  CAPSULE : Strength - 15 mg : ORAL


Patient Dose: 15 mg Oral once a day (at bedtime). 





Vitamin D3  CAPSULE : Strength - 1,000 unit : ORAL


Patient Dose: 5000 units Oral once a day. 





traMADol  TABLET : Strength - 50 mg : ORAL


Patient Dose: 50 mg Oral 2 times a day. 





atorvastatin  TABLET : Strength - 40 mg : ORAL


Patient Dose: 40 mg Oral once a day. 





folic acid  TABLET : Strength - 1 mg : ORAL


Patient Dose: unknown mg Oral 2 times a day. 





digoxin  TABLET : Strength - 125 mcg : ORAL


Patient Dose: 125 mcg Oral once a day. 





budesonide  AMPUL FOR NEBULIZATION (ML) : Strength - 0.25 mg/2 mL : INHALATION


Patient Dose: inhalation Nebulize 2 times a day. 





carvedilol  TABLET : Strength - 3.125 mg : ORAL


Patient Dose: 3.125 mg Oral once a day. 





Advair HFA  HFA AEROSOL WITH ADAPTER (GRAM) : Strength - 230 mcg-21 mcg/

actuation : INHALATION


Patient Dose: Unknown. 








Hospitalist History





- Past Medical History


Other Medical History: 





CHF, a-fib, asthma, emphysema, Notes: peripheal neuropathy, CAD, MI 15 years ago

, carotid endardectomy BPH, Past medical history includes history of 

hypertension, Past medical history includes pulmonary disease, asthma, chronic 

obstructive pulmonary disease.





- Past Surgical History


Other Surgical History: 





left carotid endarterectomy, Surgical history of orthopedic surgery, right hip 

surgery x2 , Date of surgery 11/15/2017, Surgical history of appendectomy. 

Right lung surgery - per Dr. East - 2016.





- Family History


Family History: reports: no pertinent history





- Social History


Other Social History: 





Patient denies alcohol use, Patient denies drug use, Patient is a former 

tobacco user, chewed tobacco, Patient quit smoking less than 10 years ago, 

Lives at home, with family, Patient denies alcohol use, Patient denies drug use

, Patient currently uses tobacco, chews tobacco.





- Exam


General Appearance: NAD, awake alert


Eye: PERRL, anicteric sclera


ENT: normocephalic atraumatic, no oropharyngeal lesions, moist mucosa


Neck: supple, symmetric, no JVD, no thyromegaly, no lymphadenopathy, no carotid 

bruit


Heart: RRR, no murmur, no gallops, no rubs, normal peripheral pulses


Respiratory: CTAB, no wheezes, no rales, no ronchi, normal chest expansion, no 

tachypnea, normal percussion


Gastrointestinal: soft, non-tender, non-distended, normal bowel sounds, no 

palpable masses, no hepatomegaly, no splenomegaly, no bruit


Extremities: no cyanosis, no clubbing, no edema


Skin: normal turgor, no lesions, no rashes


Neurological: cranial nerve grossly intact, normal sensation to touch, no 

weakness, no focal deficits, no new deficit


Musculoskeletal: normal tone, normal strength, no muscle wasting


Psychiatric: normal affect, normal behavior, A&O x 3





Hospitalist Results





- Labs


Result Diagrams: 


 09/30/19 00:32





 09/30/19 00:32


Lab results: 


 











WBC  17.8 thou/uL (4.8-10.8)  H  09/30/19  00:32    


 


Hgb  11.8 g/dL (14.0-18.0)  L  09/30/19  00:32    


 


Hct  36.0 % (42.0-52.0)  L  09/30/19  00:32    


 


MCV  101.0 fL (78.0-98.0)  H  09/30/19  00:32    


 


Plt Count  186 thou/uL (130-400)   09/30/19  00:32    


 


Neutrophils %  79.7 % (42.0-75.0)  H  09/30/19  00:32    


 


Sodium  137 mmol/L (136-145)   09/30/19  00:32    


 


Potassium  3.2 mmol/L (3.5-5.1)  L  09/30/19  00:32    


 


Chloride  97 mmol/L ()  L  09/30/19  00:32    


 


Carbon Dioxide  26 mmol/L (23-31)   09/30/19  00:32    


 


BUN  28 mg/dL (8.4-25.7)  H  09/30/19  00:32    


 


Creatinine  1.88 mg/dL (0.7-1.3)  H  09/30/19  00:32    


 


Glucose  105 mg/dL ()   09/30/19  00:32    


 


Calcium  9.4 mg/dL (7.8-10.44)   09/30/19  00:32    


 


Total Bilirubin  1.4 mg/dL (0.2-1.2)  H  09/30/19  00:32    


 


AST  62 U/L (5-34)  H  09/30/19  00:32    


 


ALT  102 U/L (8-55)  H  09/30/19  00:32    


 


Alkaline Phosphatase  124 U/L ()  H  09/30/19  00:32    


 


CK-MB (CK-2)  4.8 ng/mL (0-6.6)   09/30/19  00:32    


 


Troponin I  0.210 ng/mL (< 0.028)  H  09/30/19  03:34    


 


Serum Total Protein  6.5 g/dL (5.8-8.1)   09/30/19  00:32    


 


Albumin  3.9 g/dL (3.4-4.8)   09/30/19  00:32    


 


Urine Ketones  Trace mg/dL (Negative)  A  09/30/19  01:05    


 


Urine Blood  2+  (Negative)  A  09/30/19  01:05    


 


Urine Nitrite  Negative  (Negative)   09/30/19  01:05    


 


Ur Leukocyte Esterase  25 Sukhdeep/uL (Negative)   09/30/19  01:05    


 


Urine RBC  11-20 HPF (0-3)  A  09/30/19  01:05    


 


Urine WBC  7-10 HPF (0-3)  A  09/30/19  01:05    


 


Ur Squamous Epith Cells  4-6 HPF (0-3)  A  09/30/19  01:05    


 


Urine Bacteria  Rare-Few HPF (None Seen)   09/30/19  01:05    











Additional comment: 





BP: 151/72, Pulse: 69, Resp: 20, Pain: 0, O2 sat: 99 on 2L Oxygen, Time: 9/30/ 2019 03:00.





Hospitalist H&P A/P





- Problem


(1) COPD (chronic obstructive pulmonary disease)


Status: Acute   


Qualifiers: 


   COPD type: emphysema   Emphysema type: panlobular   Qualified Code(s): J43.1 

- Panlobular emphysema   





(2) Empyema


Code(s): J86.9 - PYOTHORAX WITHOUT FISTULA   Status: Acute   





(3) HTN (hypertension)


Code(s): I10 - ESSENTIAL (PRIMARY) HYPERTENSION   Status: Chronic   


Qualifiers: 


   Hypertension type: essential hypertension   Qualified Code(s): I10 - 

Essential (primary) hypertension   





(4) Paroxysmal atrial fibrillation with RVR


Code(s): I48.0 - PAROXYSMAL ATRIAL FIBRILLATION   Status: Chronic   





- Plan


Plan: 


Patient presented to ED with weakness, 


brain CT, abd US, CXR ordered by ER but not yet resulted. 


- consult case management for SNF placement


- continue chronic home o2, PT/OT consulted

## 2019-09-30 NOTE — RAD
SINGLE VIEW OF THE CHEST:

 

COMPARISON: 

10/2/2018.

 

HISTORY: 

Fall with generalized weakness.

 

FINDINGS: 

A single view of the chest shows a normal-size cardiomediastinal silhouette.  A cardiac monitor on th
e right projects over the left chest wall.  There is a small right pleural effusion.  Multiple stable
 bilateral rib fractures are seen.  No pneumothorax I present.

 

IMPRESSION: 

Small right pleural effusion.

 

POS: CET

## 2019-10-01 RX ADMIN — ASPIRIN 81 MG CHEWABLE TABLET SCH MG: 81 TABLET CHEWABLE at 08:42

## 2019-10-01 NOTE — RAD
EXAM:

XR Shoulder Lt 3 View STANDARD



PROVIDED CLINICAL HISTORY:

Pain



FINDINGS:

There is no evidence for fracture or other acute osseous abnormality. Alignment appears anatomic. [place]
nt spaces appear preserved.



IMPRESSION:

No evidence for an acute osseous abnormality. If there is persistent clinical concern, conservative m
anagement and follow-up imaging advised.



Reported By: Kwabena Lee 

Electronically Signed:  10/1/2019 11:48 AM

## 2019-10-01 NOTE — PDOC.HOSPP
- Subjective


Encounter Date: 10/01/19


Encounter Time: 10:25


Subjective: 





Pina Duarte called regarding hypotension. Coreg held this am due to low BP per 

nursing. Admitted overnight due to COPD, frequent falls. Started IVF bolus with 

NS and telemetry showing A-fib with RVR in low 100's. Pt denies any CP or SOB. 

States L shoulder sore after falling on it at home.





- Objective


Vital Signs & Weight: 


 Vital Signs (12 hours)











  Temp Pulse Pulse Pulse Pulse Resp BP


 


 10/01/19 09:30    85  85  84   84/53 L


 


 10/01/19 08:43   84     


 


 10/01/19 07:02  98.0 F  74     20 


 


 10/01/19 06:41   77     16 


 


 10/01/19 06:37   77     16 


 


 10/01/19 04:00  97.2 F L  65     18 














  BP BP BP Pulse Ox Pulse Ox Pulse Ox


 


 10/01/19 09:30  78/39 L  87/48 L    100  100


 


 10/01/19 08:43      


 


 10/01/19 07:02    103/61  100  


 


 10/01/19 06:41     99  


 


 10/01/19 06:37     99  


 


 10/01/19 04:00    128/60  98  








 Weight











Weight                         145 lb 7 oz














Result Diagrams: 


 09/30/19 00:32





 09/30/19 00:32


Additional Labs: 


 Accuchecks











  10/01/19





  10:12


 


POC Glucose  81








 Laboratory Tests











  09/30/19 09/30/19 09/30/19





  00:32 00:32 00:32


 


Total Bilirubin   1.4 H 


 


AST   62 H 


 


ALT   102 H 


 


Alkaline Phosphatase   124 H 


 


Troponin I  0.223 H  


 


TSH 3rd Generation    6.0115 H


 


Digoxin   














  09/30/19 09/30/19 09/30/19





  03:34 06:31 06:31


 


Total Bilirubin   


 


AST   


 


ALT   


 


Alkaline Phosphatase   


 


Troponin I  0.210 H  0.201 H 


 


TSH 3rd Generation   


 


Digoxin    Less than  0.15 L











Radiology Reviewed by me: Yes (CT brain - no acute process)


EKG Reviewed by me: Yes (Tele - A-fib in low 100's)





Hospitalist ROS





- Medication


Medications: 


Active Medications











Generic Name Dose Route Start Last Admin





  Trade Name Freq  PRN Reason Stop Dose Admin


 


Albuterol/Ipratropium  3 ml  09/30/19 18:30  10/01/19 06:37





  Duoneb  NEB   3 ml





  TID-RT TERRA   Administration





     





     





     





     


 


Aspirin  81 mg  10/01/19 09:00  10/01/19 08:42





  Aspirin Chewable  PO   81 mg





  DAILY TERRA   Administration





     





     





     





     


 


Atorvastatin Calcium  40 mg  09/30/19 21:00  09/30/19 21:13





  Lipitor  PO   40 mg





  HS TERRA   Administration





     





     





     





     


 


Budesonide  0.25 mg  09/30/19 18:30  10/01/19 06:41





  Pulmicort Neb Solution  INH   0.25 mg





  BID-RT TERRA   Administration





     





     





     





     


 


Carvedilol  3.125 mg  09/30/19 17:00  10/01/19 09:34





  Coreg  PO   Not Given





  BID- TERRA   





     





     





     





     


 


Cholecalciferol  5,000 units  10/01/19 09:00  10/01/19 08:42





  Vitamin D3  PO   5,000 units





  DAILY TERRA   Administration





     





     





     





     


 


Digoxin  0.125 mg  10/01/19 09:00  10/01/19 08:43





  Lanoxin  PO   0.125 mg





  DAILY TERRA   Administration





     





     





     





     


 


Docusate Sodium  100 mg  10/01/19 09:00  10/01/19 08:43





  Colace  PO   100 mg





  DAILY TERRA   Administration





     





     





     





     


 


Enoxaparin Sodium  40 mg  09/30/19 09:00  10/01/19 08:44





  Lovenox  SC   40 mg





  0900 TERRA   Administration





     





     





     





     


 


Ferrous Sulfate  325 mg  10/01/19 08:00  10/01/19 08:43





  Feosol  PO   325 mg





  QAM-[facility]   Administration





     





     





     





     


 


Folic Acid  2 mg  09/30/19 21:00  10/01/19 08:42





  Folvite  PO   2 mg





  BID TERRA   Administration





     





     





     





     


 


Gabapentin  600 mg  09/30/19 21:00  10/01/19 08:44





  Neurontin  PO   600 mg





  TID TERRA   Administration





     





     





     





     


 


Prednisone  5 mg  10/01/19 08:00  10/01/19 08:44





  Prednisone  PO   5 mg





  QAM-[facility] TERRA   Administration





     





     





     





     


 


Tamsulosin HCl  0.4 mg  09/30/19 21:00  09/30/19 21:14





  Flomax  PO   0.4 mg





  HS TERRA   Administration





     





     





     





     


 


Temazepam  30 mg  09/30/19 21:00  09/30/19 21:13





  Restoril  PO   30 mg





  HS TERRA   Administration





     





     





     





     














- Exam


General Appearance: ill appearing


General - other findings: sleepy, awakes to names, answers questions slowly


Eye: PERRL, anicteric sclera


ENT: normocephalic atraumatic, no oropharyngeal lesions


Neck: supple, symmetric, no JVD, no thyromegaly, no lymphadenopathy


Heart: no murmur, no gallops, no rubs, normal peripheral pulses, irregular


Respiratory: no rales, no ronchi


Respiratory - other findings: diminished in bases


Gastrointestinal: soft, non-tender, non-distended, normal bowel sounds, no 

palpable masses


Extremities: no edema


Extremities - other findings: + ecchymosis on chest, upper extremities


Skin: normal turgor


Neurological: cranial nerve grossly intact, no new deficit


Musculoskeletal: generalized weakness, diffuse muscle atrophy


Psychiatric: oriented to person, oriented to place, lethargic





Hosp A/P


(1) Hypotension


Status: Acute   


Plan: 


Likely multifactorial including volume depletion and iatrogenic, hold all BP 

meds, IVF bolus with NS x 1 L, check 2D echo, troponins/BNP, Cardiology 

consultation








(2) Paroxysmal atrial fibrillation with RVR


Code(s): I48.0 -    Status: Chronic   


Plan: 


Rate variable, Continue Digoxin, check 2D Echo, Cardiology consultation








(3) GERTRUDE (acute kidney injury)


Code(s): N17.9 -    Status: Acute   


Plan: 


Avoid nephrotoxic meds and limit contrast exposure, IVF's, serial creatinine








(4) Falls frequently


Code(s): R29.6 -    Status: Acute   


Plan: 


Fall precautions, PT/OT evaluation, consider SNF options








(5) COPD (chronic obstructive pulmonary disease)


Status: Chronic   


Qualifiers: 


   COPD type: emphysema   Emphysema type: panlobular   Qualified Code(s): J43.1 

- Panlobular emphysema   


Plan: 


Chronic resp failure, continue low-flow O2 supplementation, general pulmonary 

support, no exacerbation currently








(6) Demand ischemia


Code(s): I24.8 -    Status: Acute   


Plan: 


Demand state in context of hypotension and A-fib, no ACS








- Plan


PT/OT, , respiratory therapy, DVT proph w/SCDs


Trendelenberg position


IVF bolus with NS x 1L


Hold all BP meds


2D echo pending


Cardiology consultation


CM for SNF options


Check L shoulder X-rays


PT/OT for functional assessment


AM lab: CMP, CBC, Mg++, PO3, FT4





Total Critical care: 35min

## 2019-10-02 LAB
ALBUMIN SERPL BCG-MCNC: 2.8 G/DL (ref 3.4–4.8)
ALP SERPL-CCNC: 80 U/L (ref 40–110)
ALT SERPL W P-5'-P-CCNC: 47 U/L (ref 8–55)
ANION GAP SERPL CALC-SCNC: 12 MMOL/L (ref 10–20)
AST SERPL-CCNC: 41 U/L (ref 5–34)
BILIRUB SERPL-MCNC: 0.7 MG/DL (ref 0.2–1.2)
BUN SERPL-MCNC: 27 MG/DL (ref 8.4–25.7)
CALCIUM SERPL-MCNC: 7.8 MG/DL (ref 7.8–10.44)
CHLORIDE SERPL-SCNC: 107 MMOL/L (ref 98–107)
CO2 SERPL-SCNC: 23 MMOL/L (ref 23–31)
CREAT CL PREDICTED SERPL C-G-VRATE: 19 ML/MIN (ref 70–130)
GLOBULIN SER CALC-MCNC: 2.2 G/DL (ref 2.4–3.5)
GLUCOSE SERPL-MCNC: 226 MG/DL (ref 83–110)
HGB BLD-MCNC: 9.7 G/DL (ref 14–18)
MAGNESIUM SERPL-MCNC: 2.3 MG/DL (ref 1.6–2.6)
MCH RBC QN AUTO: 33.6 PG (ref 27–31)
MCV RBC AUTO: 104 FL (ref 78–98)
MDIFF COMPLETE?: YES
PLATELET # BLD AUTO: 144 THOU/UL (ref 130–400)
POTASSIUM SERPL-SCNC: 3.5 MMOL/L (ref 3.5–5.1)
RBC # BLD AUTO: 2.88 MILL/UL (ref 4.7–6.1)
SODIUM SERPL-SCNC: 138 MMOL/L (ref 136–145)
WBC # BLD AUTO: 9.6 THOU/UL (ref 4.8–10.8)

## 2019-10-02 RX ADMIN — ASPIRIN 81 MG CHEWABLE TABLET SCH MG: 81 TABLET CHEWABLE at 09:18

## 2019-10-02 NOTE — PDOC.CPN
- Subjective


Date: 10/02/19


Time: 16:32


Interval history: 





Pt much more lucid today.  Sttes his cardiologist is out of [facility] and sees 

pts in [place]





- Objective


Allergies/Adverse Reactions: 


 Allergies











Allergy/AdvReac Type Severity Reaction Status Date / Time


 


No Known Drug Allergies Allergy   Verified 09/30/19 16:04











Visit Medications: 


 Current Medications





Albuterol Sulfate (Ventolin)  3 mg NEB QIDPRN PRN


   PRN Reason: Wheezing


Albuterol/Ipratropium (Duoneb)  3 ml NEB TID-RT [facility]


   Last Admin: 10/02/19 13:59 Dose:  Not Given


Aspirin (Aspirin Chewable)  81 mg PO DAILY [facility]


   Last Admin: 10/02/19 09:18 Dose:  81 mg


Atorvastatin Calcium (Lipitor)  40 mg PO HS [facility]


   Last Admin: 10/01/19 21:55 Dose:  40 mg


Budesonide (Pulmicort Neb Solution)  0.25 mg INH BID-RT [facility]


   Last Admin: 10/02/19 07:29 Dose:  0.25 mg


Carvedilol (Coreg)  3.125 mg PO BID-WM [facility]


   Last Admin: 10/02/19 09:17 Dose:  Not Given


Cholecalciferol (Vitamin D3)  5,000 units PO DAILY [facility]


   Last Admin: 10/02/19 09:18 Dose:  5,000 units


Digoxin (Lanoxin)  0.125 mg PO DAILY [facility]


   Last Admin: 10/02/19 09:18 Dose:  0.125 mg


Docusate Sodium (Colace)  100 mg PO DAILY [facility]


   Last Admin: 10/02/19 09:18 Dose:  100 mg


Enoxaparin Sodium (Lovenox)  40 mg SC 0900 [facility]


   Last Admin: 10/02/19 09:19 Dose:  40 mg


Ferrous Sulfate (Feosol)  325 mg PO QAM-WM [facility]


   Last Admin: 10/02/19 09:17 Dose:  325 mg


Folic Acid (Folvite)  2 mg PO BID [facility]


   Last Admin: 10/02/19 09:19 Dose:  2 mg


Gabapentin (Neurontin)  600 mg PO TID [facility]


   Last Admin: 10/02/19 09:18 Dose:  600 mg


Sodium Chloride (Normal Saline 0.9%)  1,000 mls @ 50 mls/hr IV .Q20H [facility]


   Last Admin: 10/02/19 10:39 Dose:  1,000 mls


Potassium Chloride (K-Dur)  40 meq PO BID-[facility]


   Last Admin: 10/02/19 09:17 Dose:  40 meq


Prednisone (Prednisone)  5 mg PO QAM-[facility]


   Last Admin: 10/02/19 09:17 Dose:  5 mg


Senna/Docusate Sodium (Senokot S)  2 tab PO BIDPRN PRN


   PRN Reason: Constipation


Tamsulosin HCl (Flomax)  0.4 mg PO [facility]


   Last Admin: 10/01/19 21:56 Dose:  0.4 mg


Temazepam (Restoril)  15 mg PO HSPRN PRN


   PRN Reason: Insomnia


Temazepam (Restoril)  30 mg PO [facility]


   Last Admin: 10/01/19 21:56 Dose:  30 mg


Tramadol HCl (Ultram)  50 mg PO BIDPRN PRN


   PRN Reason: Pain








Vital Signs & Weight: 


 Vital Signs











  Temp Pulse Pulse Pulse Pulse Resp BP


 


 10/02/19 12:11  99.0 F  78     18 


 


 10/02/19 11:00    80  97  70   174/66 H


 


 10/02/19 07:42  98.0 F  65     18 


 


 10/02/19 07:29   66     16 


 


 10/02/19 07:27   66     16 














  BP BP BP Pulse Ox


 


 10/02/19 12:11    119/58 L  100


 


 10/02/19 11:00  100/55 L  127/63  


 


 10/02/19 07:42    140/70  100


 


 10/02/19 07:29     99


 


 10/02/19 07:27     99








 











Admit Weight                   145 lb 7 oz


 


Weight                         145 lb 7 oz

















- Physical Exam


General: alert & oriented x3, other (dishevled)


HEENT: mucus membranes moist


Neck: supple neck, midline trachea


Cardiac: regular rate


Lungs: normal exam


Abdomen: soft





- Labs


Result Diagrams: 


 10/02/19 05:19





 10/02/19 05:19


 Troponin/CKMB











CK-MB (CK-2)  4.8 ng/mL (0-6.6)   09/30/19  00:32    


 


Troponin I  0.201 ng/mL (< 0.028)  H  09/30/19  06:31    














- Assessment/Plan


Assessment/Plan: 


Elevated troponin


Weakness


Acute on chronic renal insufficiency


COPD





Pt more lucid today


Still in SR


One episode of afib


Add multaq


Given frequent falls, I do not feel comfortable with long term ACT


Will defer to primary cardiologist in [place], Dr. Orr


No other recommendations


Will sign off

## 2019-10-02 NOTE — CON
DATE OF CONSULTATION:  



REASON FOR CONSULTATION:  Elevated troponin.



HISTORY OF PRESENT ILLNESS:  Mr. Moseley is an 84-year-old gentleman, who

recently was admitted for weakness.  No other specific complaints.  He was in atrial

fibrillation, now sinus rhythm.  He does have a history of paroxysmal atrial

fibrillation in the past.  The history is limited.  The patient is a very poor

historian.  He has had frequent falls with significant ecchymosis present on his

body. 



PAST MEDICAL HISTORY:  COPD, paroxysmal atrial fibrillation, emphysema, CAD status

post MI, hypertension, carotid endarterectomy, previous lung surgery, and

appendectomy. 



HOME MEDICATIONS:  Include:

1. Aspirin.

2. Gabapentin.

3. Tamsulosin.

4. Prednisone.

5. Ipratropium.

6. Temazepam.

7. Vitamin D3.

8. Tramadol.

9. Atorvastatin.

10. Folic acid.

11. Digoxin.

12. Carvedilol.

13. Advair.



REVIEW OF SYSTEMS:  A 10-point review of systems difficult to obtain.



PHYSICAL EXAMINATION:

GENERAL:  Patient is a pleasant gentleman, who is in no acute distress.  The patient

appears their stated age.  Poor historian. 

VITAL SIGNS:  Blood pressure 104/58, pulse 73, and temperature 97.9. 

NEUROLOGIC:  The patient is alert and oriented x3 with no focal neurologic deficits. 

HEENT:  Sclerae without icterus.  Mouth has moist mucous membranes with normal

pallor. 

NECK:  No JVD.  Carotid upstroke brisk.  No bruits bilaterally. 

LUNGS:  Clear to auscultation with unlabored respirations. 

BACK:  No scoliosis or kyphosis. 

CARDIAC:  Regular rate and rhythm with normal S1 and S2.  No S3 or S4 noted.  No

significant rubs, murmurs, thrills, or gallops noted throughout the precordium.  PMI

is not displaced.  There is no parasternal heave. 

ABDOMEN:  Soft, nontender, nondistended.  No peritoneal signs present.  No

hepatosplenomegaly.  No abnormal striae. 

EXTREMITIES:  2+ femoral and 2+ dorsalis pedis pulses.  No cyanosis, clubbing, or

edema. 

SKIN:  No gross abnormalities.



PERTINENT LABORATORY DATA:  Hemoglobin 11.8.  Creatinine 1.88 with a GFR of 34.

Peak troponin 0.2.  Total bilirubin 1.4.  AST and ALT of 62/102 and alkaline

phosphatase 124. 



IMPRESSION:  

1. Elevated troponin.

2. Paroxysmal atrial fibrillation.

3. Weakness.

4. Chronic obstructive pulmonary disease.

5. Coronary artery disease.



RECOMMENDATIONS:  Elevated troponin, likely type 2 MI.  He has had significant

ecchymosis with frequent falls.  He has had several bouts of hypotension during the

day.  He is likely volume contracted.  His overall LVEF is estimated at 55% to 60%.

At this point, we will continue with resuscitation with IV fluids.  His blood

pressure now appears more stable this afternoon. 



From a CV standpoint, we would recommend beta-blocker therapy if tolerated in

addition to aspirin and statin therapy.  We will also treat underlying

hypothyroidism given a TSH is 6.0. 





Job ID:  [id number]

## 2019-10-02 NOTE — PDOC.HOSPP
- Subjective


Encounter Date: 10/02/19


Encounter Time: 09:00


Subjective: 





f/u for hypotension, COPD, dehydration and frequent falls. 





- Objective


Vital Signs & Weight: 


 Vital Signs (12 hours)











  Temp Pulse Resp BP Pulse Ox


 


 10/02/19 07:29   66  16   99


 


 10/02/19 07:27   66  16   99


 


 10/02/19 03:37  98.0 F  64  18  116/58 L  97


 


 10/02/19 02:11      94 L


 


 10/02/19 00:37   59 L  16  104/55 L 








 Weight











Admit Weight                   145 lb 7 oz


 


Weight                         145 lb 7 oz














I&O: 


 











 10/01/19 10/02/19 10/03/19





 06:59 06:59 06:59


 


Intake Total  2480 


 


Output Total  585 


 


Balance  1895 











Result Diagrams: 


 10/02/19 05:19





 10/02/19 05:19


Additional Labs: 


 Accuchecks











  10/01/19





  10:12


 


POC Glucose  81








 Laboratory Tests











  11/12/17 11/12/17 11/13/17





  18:28 18:28 03:44


 


WBC  32.9 H  


 


Hgb  12.4 L  


 


Hct   


 


Creatinine   0.95  1.46 H


 


Total Bilirubin   


 


AST   


 


ALT   


 


Alkaline Phosphatase   


 


Troponin I   


 


Free T4   


 


TSH 3rd Generation   


 


Digoxin   














  11/13/17 11/14/17 11/14/17





  03:44 04:53 04:53


 


WBC  18.2 H   20.7 H


 


Hgb  10.6 L   8.6 L


 


Hct   


 


Creatinine   1.47 H 


 


Total Bilirubin   


 


AST   


 


ALT   


 


Alkaline Phosphatase   


 


Troponin I   


 


Free T4   


 


TSH 3rd Generation   


 


Digoxin   














  11/15/17 11/15/17 01/09/18





  04:22 04:22 02:15


 


WBC   19.7 H 


 


Hgb   7.1 L 


 


Hct   


 


Creatinine  1.66 H   0.99


 


Total Bilirubin   


 


AST   


 


ALT   


 


Alkaline Phosphatase   


 


Troponin I   


 


Free T4   


 


TSH 3rd Generation   


 


Digoxin   














  09/30/19 09/30/19 09/30/19





  00:32 00:32 00:32


 


WBC   


 


Hgb  11.8 L  


 


Hct  36.0 L  


 


Creatinine    1.88 H


 


Total Bilirubin    1.4 H


 


AST    62 H


 


ALT    102 H


 


Alkaline Phosphatase    124 H


 


Troponin I   0.223 H 


 


Free T4   


 


TSH 3rd Generation   


 


Digoxin   














  09/30/19 09/30/19 09/30/19





  00:32 03:34 06:31


 


WBC   


 


Hgb   


 


Hct   


 


Creatinine   


 


Total Bilirubin   


 


AST   


 


ALT   


 


Alkaline Phosphatase   


 


Troponin I   0.210 H  0.201 H


 


Free T4   


 


TSH 3rd Generation  6.0115 H  


 


Digoxin   














  09/30/19 10/02/19





  06:31 05:19


 


WBC  


 


Hgb  


 


Hct  


 


Creatinine  


 


Total Bilirubin  


 


AST  


 


ALT  


 


Alkaline Phosphatase  


 


Troponin I  


 


Free T4   0.78


 


TSH 3rd Generation  


 


Digoxin  Less than  0.15 L 











Radiology Reviewed by me: Yes (Echo - EF 55-60%, mod AS, decreased AV excursion)


EKG Reviewed by me: Yes (Tele - Sinus bradycardia)





Hospitalist ROS





- Medication


Medications: 


Active Medications











Generic Name Dose Route Start Last Admin





  Trade Name Kel  PRN Reason Stop Dose Admin


 


Albuterol/Ipratropium  3 ml  09/30/19 18:30  10/02/19 07:27





  Duoneb  NEB   3 ml





  TID-RT TERRA   Administration





     





     





     





     


 


Aspirin  81 mg  10/01/19 09:00  10/01/19 08:42





  Aspirin Chewable  PO   81 mg





  DAILY TERRA   Administration





     





     





     





     


 


Atorvastatin Calcium  40 mg  09/30/19 21:00  10/01/19 21:55





  Lipitor  PO   40 mg





  HS TERRA   Administration





     





     





     





     


 


Budesonide  0.25 mg  09/30/19 18:30  10/02/19 07:29





  Pulmicort Neb Solution  INH   0.25 mg





  BID-RT TERRA   Administration





     





     





     





     


 


Carvedilol  3.125 mg  09/30/19 17:00  10/01/19 16:58





  Coreg  PO   Not Given





  BID-WM TERRA   





     





     





     





     


 


Cholecalciferol  5,000 units  10/01/19 09:00  10/01/19 08:42





  Vitamin D3  PO   5,000 units





  DAILY TERRA   Administration





     





     





     





     


 


Digoxin  0.125 mg  10/01/19 09:00  10/01/19 08:43





  Lanoxin  PO   0.125 mg





  DAILY TERRA   Administration





     





     





     





     


 


Docusate Sodium  100 mg  10/01/19 09:00  10/01/19 08:43





  Colace  PO   100 mg





  DAILY TERRA   Administration





     





     





     





     


 


Enoxaparin Sodium  40 mg  09/30/19 09:00  10/01/19 08:44





  Lovenox  SC   40 mg





  0900 TERRA   Administration





     





     





     





     


 


Ferrous Sulfate  325 mg  10/01/19 08:00  10/01/19 08:43





  Feosol  PO   325 mg





  QAM-WM TERRA   Administration





     





     





     





     


 


Folic Acid  2 mg  09/30/19 21:00  10/01/19 21:56





  Folvite  PO   2 mg





  BID TERRA   Administration





     





     





     





     


 


Gabapentin  600 mg  09/30/19 21:00  10/01/19 21:56





  Neurontin  PO   600 mg





  TID TERRA   Administration





     





     





     





     


 


Potassium Chloride  40 meq  10/01/19 17:00  10/01/19 17:46





  K-Dur  PO   40 meq





  BID-WM TERRA   Administration





     





     





     





     


 


Prednisone  5 mg  10/01/19 08:00  10/01/19 08:44





  Prednisone  PO   5 mg





  QAM-WM TERRA   Administration





     





     





     





     


 


Tamsulosin HCl  0.4 mg  09/30/19 21:00  10/01/19 21:56





  Flomax  PO   0.4 mg





  HS TERRA   Administration





     





     





     





     


 


Temazepam  30 mg  09/30/19 21:00  10/01/19 21:56





  Restoril  PO   30 mg





  HS TERRA   Administration





     





     





     





     














- Exam


General Appearance: NAD, awake alert


Eye: PERRL, anicteric sclera


ENT: normocephalic atraumatic, no oropharyngeal lesions


Neck: supple, symmetric, no JVD, no thyromegaly, no lymphadenopathy


Heart: RRR, no gallops, no rubs, normal peripheral pulses


Respiratory: CTAB, no wheezes, no rales, no ronchi, normal chest expansion


Gastrointestinal: soft, non-tender, non-distended, normal bowel sounds, no 

palpable masses


Extremities: no cyanosis, no edema


Skin: normal turgor


Skin - other findings: multiple contusions/ecchymosis on extremities/chest


Neurological: cranial nerve grossly intact, no new deficit


Musculoskeletal: normal tone, generalized weakness


Psychiatric: normal affect, A&O x 3





Hosp A/P


(1) Hypotension


Status: Acute   


Plan: 


Improved with volume replacement, hold am Coreg, may need to decrease dose of 

Coreg for home








(2) Paroxysmal atrial fibrillation with RVR


Code(s): I48.0 - PAROXYSMAL ATRIAL FIBRILLATION   Status: Chronic   


Plan: 


SR currently, continue Digoxin, telemetry monitoring, not a candidate for 

anticoagulation due to multiple falls








(3) GERTRUDE (acute kidney injury)


Code(s): N17.9 - ACUTE KIDNEY FAILURE, UNSPECIFIED   Status: Acute   


Plan: 


Secondary to hypotension  and volume depletion, avoid nephrotoxic meds and 

limit contrast, renal dose medications








(4) Falls frequently


Code(s): R29.6 - REPEATED FALLS   Status: Acute   


Plan: 


PT/OT evaluation, SNF options








(5) COPD (chronic obstructive pulmonary disease)


Status: Chronic   


Qualifiers: 


   COPD type: emphysema   Emphysema type: panlobular   Qualified Code(s): J43.1 

- Panlobular emphysema   


Plan: 


Stable currently, continue pulmonary supportive care








(6) Demand ischemia


Code(s): I24.8 - OTHER FORMS OF ACUTE ISCHEMIC HEART DISEASE   Status: Acute   


Plan: 


Likely NSTEMI Type II, continue ASA, low-dose Coreg, Statin








- Plan


PT/OT, , out of bed/ambulate


Stable currently


IVF NS @ 50ml/h


Hold Coreg this am


Cardiology consultation appreciated


CM for SNF options


PT/OT for functional assessment


AM lab: BMP, CBC

## 2019-10-02 NOTE — EKG
Test Reason : CODE GREEN

Blood Pressure : ***/*** mmHG

Vent. Rate : 102 BPM     Atrial Rate : 097 BPM

   P-R Int : 000 ms          QRS Dur : 086 ms

    QT Int : 380 ms       P-R-T Axes : 000 023 -03 degrees

   QTc Int : 495 ms

 

Atrial fibrillation with rapid ventricular response

Posterior infarct , age undetermined

Abnormal ECG

When compared with ECG of 30-SEP-2019 00:10, (Unconfirmed)

Atrial fibrillation has replaced Sinus rhythm

ST now depressed in Anterior leads

Nonspecific T wave abnormality no longer evident in Inferior leads

Nonspecific T wave abnormality, improved in Lateral leads

QT has lengthened

Confirmed by BRIDGETTE ENCARNACION, DR. MUÑOZ ([id number]) on 10/2/2019 7:55:37 PM

 

Referred By:  JOSE           Confirmed By:DR. TONI ANGELES MD

## 2019-10-03 LAB
ANION GAP SERPL CALC-SCNC: 8 MMOL/L (ref 10–20)
BUN SERPL-MCNC: 23 MG/DL (ref 8.4–25.7)
CALCIUM SERPL-MCNC: 7.9 MG/DL (ref 7.8–10.44)
CHLORIDE SERPL-SCNC: 110 MMOL/L (ref 98–107)
CO2 SERPL-SCNC: 27 MMOL/L (ref 23–31)
CREAT CL PREDICTED SERPL C-G-VRATE: 21 ML/MIN (ref 70–130)
GLUCOSE SERPL-MCNC: 98 MG/DL (ref 83–110)
HGB BLD-MCNC: 8.5 G/DL (ref 14–18)
MCH RBC QN AUTO: 33.7 PG (ref 27–31)
MCV RBC AUTO: 100 FL (ref 78–98)
MDIFF COMPLETE?: YES
PLATELET # BLD AUTO: 139 THOU/UL (ref 130–400)
POTASSIUM SERPL-SCNC: 3.9 MMOL/L (ref 3.5–5.1)
RBC # BLD AUTO: 2.53 MILL/UL (ref 4.7–6.1)
SODIUM SERPL-SCNC: 141 MMOL/L (ref 136–145)
WBC # BLD AUTO: 10.5 THOU/UL (ref 4.8–10.8)

## 2019-10-03 RX ADMIN — ASPIRIN 81 MG CHEWABLE TABLET SCH MG: 81 TABLET CHEWABLE at 08:17

## 2019-10-03 RX ADMIN — FAMOTIDINE SCH MG: 10 INJECTION, SOLUTION INTRAVENOUS at 08:15

## 2019-10-03 NOTE — PDOC.HOSPP
- Subjective


Encounter Date: 10/03/19


Encounter Time: 18:30


Subjective: 





f/u for hypotension, GERTRUDE, COPD and frequent falls. Feels ok overall. Some 

general fatigue. Tolerating po intake.





- Objective


Vital Signs & Weight: 


 Vital Signs (12 hours)











  Temp Pulse Resp BP Pulse Ox


 


 10/03/19 15:58  97.5 F L  80  20  125/60  92 L


 


 10/03/19 13:25   51 L  16   99


 


 10/03/19 11:29     96/52 L 


 


 10/03/19 11:20  98.1 F  62  24 H  87/51 L  97


 


 10/03/19 08:20  98.2 F  78  24 H  115/58 L  97


 


 10/03/19 07:15   68  16   100


 


 10/03/19 07:13   68  16   100








 Weight











Admit Weight                   145 lb 7 oz


 


Weight                         150 lb 14.4 oz














I&O: 


 











 10/02/19 10/03/19 10/04/19





 06:59 06:59 06:59


 


Intake Total 2480 1840 1550


 


Output Total 585 1500 


 


Balance 4685 622 5686











Result Diagrams: 


 10/03/19 04:28





 10/03/19 04:28


Additional Labs: 





 Laboratory Tests











  11/12/17 11/12/17 11/13/17





  18:28 18:28 03:44


 


WBC  32.9 H  


 


Hgb  12.4 L  


 


Hct   


 


BUN   


 


Creatinine   0.95  1.46 H


 


Total Bilirubin   


 


AST   


 


ALT   


 


Alkaline Phosphatase   


 


Troponin I   


 


Free T4   


 


TSH 3rd Generation   


 


Digoxin   














  11/13/17 11/14/17 11/14/17





  03:44 04:53 04:53


 


WBC  18.2 H   20.7 H


 


Hgb  10.6 L   8.6 L


 


Hct   


 


BUN   


 


Creatinine   1.47 H 


 


Total Bilirubin   


 


AST   


 


ALT   


 


Alkaline Phosphatase   


 


Troponin I   


 


Free T4   


 


TSH 3rd Generation   


 


Digoxin   














  11/15/17 11/15/17 01/09/18





  04:22 04:22 02:15


 


WBC   19.7 H 


 


Hgb   7.1 L 


 


Hct   


 


BUN   


 


Creatinine  1.66 H   0.99


 


Total Bilirubin   


 


AST   


 


ALT   


 


Alkaline Phosphatase   


 


Troponin I   


 


Free T4   


 


TSH 3rd Generation   


 


Digoxin   














  09/30/19 09/30/19 09/30/19





  00:32 00:32 00:32


 


WBC   


 


Hgb  11.8 L  


 


Hct  36.0 L  


 


BUN   


 


Creatinine    1.88 H


 


Total Bilirubin    1.4 H


 


AST    62 H


 


ALT    102 H


 


Alkaline Phosphatase    124 H


 


Troponin I   0.223 H 


 


Free T4   


 


TSH 3rd Generation   


 


Digoxin   














  09/30/19 09/30/19 09/30/19





  00:32 03:34 06:31


 


WBC   


 


Hgb   


 


Hct   


 


BUN   


 


Creatinine   


 


Total Bilirubin   


 


AST   


 


ALT   


 


Alkaline Phosphatase   


 


Troponin I   0.210 H  0.201 H


 


Free T4   


 


TSH 3rd Generation  6.0115 H  


 


Digoxin   














  09/30/19 10/02/19 10/02/19





  06:31 05:19 05:19


 


WBC   


 


Hgb   


 


Hct   


 


BUN   27 H 


 


Creatinine   2.68 H 


 


Total Bilirubin   


 


AST   


 


ALT   


 


Alkaline Phosphatase   


 


Troponin I   


 


Free T4    0.78


 


TSH 3rd Generation   


 


Digoxin  Less than  0.15 L  











EKG Reviewed by me: Yes (Tele  - SR)





Hospitalist ROS





- Medication


Medications: 


Active Medications











Generic Name Dose Route Start Last Admin





  Trade Name Freq  PRN Reason Stop Dose Admin


 


Albuterol/Ipratropium  3 ml  09/30/19 18:30  10/03/19 13:25





  Duoneb  NEB   3 ml





  TID-RT TERRA   Administration





     





     





     





     


 


Aspirin  81 mg  10/01/19 09:00  10/03/19 08:17





  Aspirin Chewable  PO   81 mg





  DAILY TERRA   Administration





     





     





     





     


 


Atorvastatin Calcium  40 mg  09/30/19 21:00  10/02/19 21:03





  Lipitor  PO   40 mg





  HS TERRA   Administration





     





     





     





     


 


Budesonide  0.25 mg  09/30/19 18:30  10/03/19 07:15





  Pulmicort Neb Solution  INH   0.25 mg





  BID-RT TERRA   Administration





     





     





     





     


 


Carvedilol  3.125 mg  09/30/19 17:00  10/03/19 17:32





  Coreg  PO   3.125 mg





  BID-WM TERRA   Administration





     





     





     





     


 


Cholecalciferol  5,000 units  10/01/19 09:00  10/03/19 08:17





  Vitamin D3  PO   5,000 units





  DAILY TERRA   Administration





     





     





     





     


 


Digoxin  0.125 mg  10/01/19 09:00  10/03/19 08:16





  Lanoxin  PO   0.125 mg





  DAILY TERRA   Administration





     





     





     





     


 


Docusate Sodium  100 mg  10/01/19 09:00  10/03/19 08:18





  Colace  PO   100 mg





  DAILY TERRA   Administration





     





     





     





     


 


Dronedarone  400 mg  10/02/19 17:00  10/03/19 17:32





  Multaq  PO   400 mg





  BID-WM TERRA   Administration





     





     





     





     


 


Enoxaparin Sodium  40 mg  09/30/19 09:00  10/03/19 08:15





  Lovenox  SC   40 mg





  0900 TERRA   Administration





     





     





     





     


 


Famotidine  20 mg  10/03/19 09:00  10/03/19 08:15





  Pepcid  SLOW IVP   20 mg





  DAILY TERRA   Administration





     





     





     





     


 


Ferrous Sulfate  325 mg  10/01/19 08:00  10/03/19 08:15





  Feosol  PO   325 mg





  QAM-WM TERRA   Administration





     





     





     





     


 


Folic Acid  2 mg  09/30/19 21:00  10/03/19 08:18





  Folvite  PO   2 mg





  BID TERRA   Administration





     





     





     





     


 


Gabapentin  600 mg  09/30/19 21:00  10/03/19 15:56





  Neurontin  PO   600 mg





  TID TERRA   Administration





     





     





     





     


 


Sodium Chloride  1,000 mls @ 50 mls/hr  10/02/19 09:45  10/03/19 06:10





  Normal Saline 0.9%  IV   1,000 mls





  .Q20H TERRA   Administration





     





     





     





     


 


Potassium Chloride  40 meq  10/01/19 17:00  10/03/19 17:33





  K-Dur  PO   40 meq





  BID-WM TERRA   Administration





     





     





     





     


 


Prednisone  5 mg  10/01/19 08:00  10/03/19 08:15





  Prednisone  PO   5 mg





  QAM-WM TERRA   Administration





     





     





     





     


 


Tamsulosin HCl  0.4 mg  09/30/19 21:00  10/02/19 21:02





  Flomax  PO   0.4 mg





  HS TERRA   Administration





     





     





     





     


 


Temazepam  30 mg  09/30/19 21:00  10/02/19 21:02





  Restoril  PO   30 mg





  HS TERRA   Administration





     





     





     





     


 


Tramadol HCl  50 mg  09/30/19 16:30  10/02/19 21:06





  Ultram  PO   50 mg





  BIDPRN PRN   Administration





  Pain   





     





     





     














- Exam


General Appearance: NAD, awake alert


Eye: PERRL, anicteric sclera


ENT: normocephalic atraumatic, no oropharyngeal lesions


Neck: supple, symmetric, no JVD, no thyromegaly, no lymphadenopathy


Heart: RRR, no murmur, no gallops, no rubs, normal peripheral pulses


Respiratory: CTAB, no wheezes, no rales, no ronchi


Gastrointestinal: soft, non-tender, non-distended, normal bowel sounds


Extremities: no cyanosis, no clubbing, no edema


Extremities - other findings: multiple areas of ecchymosis on chest/UE's


Skin: normal turgor


Neurological: cranial nerve grossly intact, no new deficit


Musculoskeletal: normal tone, generalized weakness


Psychiatric: A&O x 3





Hosp A/P


(1) Hypotension


Status: Acute   


Plan: 


Likely due to dehydration in conjunction with BP meds, titrate BP regimen, 

continue low-volume IVF's








(2) Paroxysmal atrial fibrillation with RVR


Code(s): I48.0 - PAROXYSMAL ATRIAL FIBRILLATION   Status: Chronic   


Plan: 


SR currently








(3) GERTRUDE (acute kidney injury)


Code(s): N17.9 - ACUTE KIDNEY FAILURE, UNSPECIFIED   Status: Acute   


Plan: 


Mild improvement, continue low-volume IVF's, avoid nephrotoxic meds and limit 

contrast








(4) Falls frequently


Code(s): R29.6 - REPEATED FALLS   Status: Acute   


Plan: 


PT/OT for ambulation, Rehab consult pending








(5) COPD (chronic obstructive pulmonary disease)


Status: Chronic   


Qualifiers: 


   COPD type: emphysema   Emphysema type: panlobular   Qualified Code(s): J43.1 

- Panlobular emphysema   


Plan: 


Stable, no exacerbation








(6) Demand ischemia


Code(s): I24.8 - OTHER FORMS OF ACUTE ISCHEMIC HEART DISEASE   Status: Acute   





- Plan


PT/OT, , DVT proph w/SCDs


Stable currently


IVF NS @ 50ml/h


Decrease Coreg 1.5625mg BID


Cardiology consultation appreciated


Rehab pending


PT/OT for functional assessment


AM lab: BMP, CBC

## 2019-10-04 LAB
ANION GAP SERPL CALC-SCNC: 7 MMOL/L (ref 10–20)
BUN SERPL-MCNC: 25 MG/DL (ref 8.4–25.7)
CALCIUM SERPL-MCNC: 8.1 MG/DL (ref 7.8–10.44)
CHLORIDE SERPL-SCNC: 111 MMOL/L (ref 98–107)
CO2 SERPL-SCNC: 27 MMOL/L (ref 23–31)
CREAT CL PREDICTED SERPL C-G-VRATE: 20 ML/MIN (ref 70–130)
GLUCOSE SERPL-MCNC: 107 MG/DL (ref 83–110)
HGB BLD-MCNC: 8.2 G/DL (ref 14–18)
MACROCYTES BLD QL SMEAR: (no result) (100X)
MCH RBC QN AUTO: 33.4 PG (ref 27–31)
MCV RBC AUTO: 105 FL (ref 78–98)
MDIFF COMPLETE?: YES
PLATELET # BLD AUTO: 135 THOU/UL (ref 130–400)
POLYCHROMASIA BLD QL SMEAR: (no result) (100X)
POTASSIUM SERPL-SCNC: 5.3 MMOL/L (ref 3.5–5.1)
PROT UR-MCNC: 14 MG/DL (ref 1–14)
RBC # BLD AUTO: 2.44 MILL/UL (ref 4.7–6.1)
SODIUM SERPL-SCNC: 140 MMOL/L (ref 136–145)
SODIUM UR-SCNC: 89 MMOL/L
UUN UR-MCNC: 171 MG/DL
WBC # BLD AUTO: 8.4 THOU/UL (ref 4.8–10.8)

## 2019-10-04 RX ADMIN — ASPIRIN 81 MG CHEWABLE TABLET SCH MG: 81 TABLET CHEWABLE at 08:57

## 2019-10-04 RX ADMIN — FAMOTIDINE SCH MG: 10 INJECTION, SOLUTION INTRAVENOUS at 08:58

## 2019-10-04 RX ADMIN — ALBUMIN HUMAN SCH GM: 250 SOLUTION INTRAVENOUS at 17:00

## 2019-10-04 RX ADMIN — ALBUMIN HUMAN SCH GM: 250 SOLUTION INTRAVENOUS at 12:21

## 2019-10-04 RX ADMIN — Medication SCH ML: at 20:58

## 2019-10-04 NOTE — PDOC.HOSPP
- Subjective


Encounter Date: 10/04/19


Encounter Time: 11:35


Subjective: 


85 y/o male with multiple comobidities admitted with worsening weakness 

associated with falls. Feeling better but still weak. Oral intake is improving. 

No fever or chest pain.





- Objective


Vital Signs & Weight: 


 Vital Signs (12 hours)











  Temp Pulse Resp BP Pulse Ox


 


 10/04/19 08:57   60   


 


 10/04/19 08:23      100


 


 10/04/19 07:51   60  18  


 


 10/04/19 07:48  97.6 F  60  20  126/60  100


 


 10/04/19 04:00  97.5 F L  59 L  22 H  115/57 L  99


 


 10/04/19 03:59      99








 Weight











Admit Weight                   145 lb 7 oz


 


Weight                         150 lb 14.4 oz














I&O: 


 











 10/03/19 10/04/19 10/05/19





 06:59 06:59 06:59


 


Intake Total 1840 2200 


 


Output Total 1500 350 


 


Balance 340 1850 











Result Diagrams: 


 10/04/19 05:28





 10/04/19 05:28





Hospitalist ROS





- Medication


Medications: 


Active Medications











Generic Name Dose Route Start Last Admin





  Trade Name Freq  PRN Reason Stop Dose Admin


 


Albuterol Sulfate  3 mg  09/30/19 16:30  10/03/19 21:49





  Ventolin  NEB   3 mg





  QIDPRN PRN   Administration





  Wheezing   





     





     





     


 


Albuterol/Ipratropium  3 ml  09/30/19 18:30  10/04/19 07:51





  Duoneb  NEB   3 ml





  TID-RT TERRA   Administration





     





     





     





     


 


Aspirin  81 mg  10/01/19 09:00  10/04/19 08:57





  Aspirin Chewable  PO   81 mg





  DAILY TERRA   Administration





     





     





     





     


 


Atorvastatin Calcium  40 mg  09/30/19 21:00  10/03/19 21:33





  Lipitor  PO   40 mg





  HS TERRA   Administration





     





     





     





     


 


Budesonide  0.25 mg  09/30/19 18:30  10/04/19 07:51





  Pulmicort Neb Solution  INH   0.25 mg





  BID-RT TERRA   Administration





     





     





     





     


 


Carvedilol  1.5625 mg  10/04/19 08:00  10/04/19 08:56





  Coreg  PO   1.5625 mg





  BID-WM TERRA   Administration





     





     





     





     


 


Cholecalciferol  5,000 units  10/01/19 09:00  10/04/19 08:57





  Vitamin D3  PO   5,000 units





  DAILY TERRA   Administration





     





     





     





     


 


Digoxin  0.125 mg  10/01/19 09:00  10/04/19 08:57





  Lanoxin  PO   0.125 mg





  DAILY TERRA   Administration





     





     





     





     


 


Docusate Sodium  100 mg  10/01/19 09:00  10/04/19 08:57





  Colace  PO   100 mg





  DAILY TERRA   Administration





     





     





     





     


 


Dronedarone  400 mg  10/02/19 17:00  10/04/19 08:57





  Multaq  PO   400 mg





  BID-WM TERRA   Administration





     





     





     





     


 


Enoxaparin Sodium  30 mg  10/04/19 09:00  10/04/19 09:34





  Lovenox  SC   Not Given





  [facility]   





     





     





     





     


 


Famotidine  20 mg  10/03/19 09:00  10/04/19 08:58





  Pepcid  SLOW IVP   20 mg





  DAILY TERRA   Administration





     





     





     





     


 


Ferrous Sulfate  325 mg  10/01/19 08:00  10/04/19 08:57





  Feosol  PO   325 mg





  QAM-WM TERRA   Administration





     





     





     





     


 


Folic Acid  2 mg  09/30/19 21:00  10/04/19 08:58





  Folvite  PO   2 mg





  BID TERRA   Administration





     





     





     





     


 


Gabapentin  600 mg  09/30/19 21:00  10/04/19 08:58





  Neurontin  PO   600 mg





  TID TERRA   Administration





     





     





     





     


 


Prednisone  5 mg  10/01/19 08:00  10/04/19 08:57





  Prednisone  PO   5 mg





  QAM-WM TERRA   Administration





     





     





     





     


 


Tamsulosin HCl  0.4 mg  09/30/19 21:00  10/03/19 21:34





  Flomax  PO   0.4 mg





  HS TERRA   Administration





     





     





     





     


 


Temazepam  30 mg  09/30/19 21:00  10/03/19 21:34





  Restoril  PO   30 mg





  HS TERRA   Administration





     





     





     





     


 


Tramadol HCl  50 mg  09/30/19 16:30  10/02/19 21:06





  Ultram  PO   50 mg





  BIDPRN PRN   Administration





  Pain   





     





     





     














- Exam


General Appearance: awake alert


Eye: anicteric sclera


ENT: normocephalic atraumatic, moist mucosa


Neck: supple, no JVD


Heart: RRR


Respiratory - other findings: fair air entrty bilaterally with few bibasal 

crackles


Gastrointestinal: soft, non-tender, non-distended, normal bowel sounds


Extremities - other findings: trace leg edema


Skin - other findings: scattered ecchymosis and bruise at various stages


Neurological: cranial nerve grossly intact


Musculoskeletal: generalized weakness, diffuse muscle atrophy


Psychiatric: normal affect, A&O x 3





Hosp A/P


(1) GERTRUDE (acute kidney injury)


Code(s): N17.9 - ACUTE KIDNEY FAILURE, UNSPECIFIED   Status: Acute   





(2) Hyperkalemia


Code(s): E87.5 - HYPERKALEMIA   Status: Acute   





(3) Falls frequently


Code(s): R29.6 - REPEATED FALLS   Status: Acute   





(4) Hypotension


Status: Acute   





(5) Demand ischemia


Code(s): I24.8 - OTHER FORMS OF ACUTE ISCHEMIC HEART DISEASE   Status: Acute   





(6) Macrocytic anemia


Code(s): D53.9 - NUTRITIONAL ANEMIA, UNSPECIFIED   Status: Acute   





(7) CAD (coronary artery disease)


Code(s): I25.10 - ATHSCL HEART DISEASE OF NATIVE CORONARY ARTERY W/O ANG PCTRS 

  Status: Chronic   


Qualifiers: 


   Coronary Disease-Associated Artery/Lesion type: native artery   Native vs. 

transplanted heart: native heart   Associated angina: without angina   

Qualified Code(s): I25.10 - Atherosclerotic heart disease of native coronary 

artery without angina pectoris   





(8) COPD (chronic obstructive pulmonary disease)


Status: Chronic   


Qualifiers: 


   COPD type: emphysema   Emphysema type: panlobular   Qualified Code(s): J43.1 

- Panlobular emphysema   





(9) HTN (hypertension)


Code(s): I10 - ESSENTIAL (PRIMARY) HYPERTENSION   Status: Chronic   


Qualifiers: 


   Hypertension type: essential hypertension   Qualified Code(s): I10 - 

Essential (primary) hypertension   





(10) Paroxysmal atrial fibrillation with RVR


Code(s): I48.0 - PAROXYSMAL ATRIAL FIBRILLATION   Status: Chronic   





(11) Physical deconditioning


Code(s): R53.81 - OTHER MALAISE   Status: Acute   





- Plan


DC IVF. Give albumin given hypoalbuminemia


Stop oral potassium supplementation


Get urine electrolyte


Wean oxygen as tolerated.


PT/Ot to continue.


Get renal US.


Get anemia chemistry.


Repeat BMp and CBC in the am.

## 2019-10-05 LAB
ALBUMIN SERPL BCG-MCNC: 3.2 G/DL (ref 3.4–4.8)
ANION GAP SERPL CALC-SCNC: 7 MMOL/L (ref 10–20)
BASOPHILS # BLD AUTO: 0 THOU/UL (ref 0–0.2)
BASOPHILS NFR BLD AUTO: 0.3 % (ref 0–1)
BUN SERPL-MCNC: 22 MG/DL (ref 8.4–25.7)
BUN/CREAT SERPL: 8.8
CALCIUM SERPL-MCNC: 8.5 MG/DL (ref 7.8–10.44)
CHLORIDE SERPL-SCNC: 111 MMOL/L (ref 98–107)
CO2 SERPL-SCNC: 27 MMOL/L (ref 23–31)
CREAT CL PREDICTED SERPL C-G-VRATE: 22 ML/MIN (ref 70–130)
EOSINOPHIL # BLD AUTO: 0.1 THOU/UL (ref 0–0.7)
EOSINOPHIL NFR BLD AUTO: 1.8 % (ref 0–10)
FERRITIN SERPL-MCNC: 825.09 NG/ML (ref 22–322)
GLUCOSE SERPL-MCNC: 93 MG/DL (ref 83–110)
HGB BLD-MCNC: 7.7 G/DL (ref 14–18)
IRON SERPL-MCNC: 103 UG/DL (ref 65–175)
LYMPHOCYTES # BLD: 1.6 THOU/UL (ref 1.2–3.4)
LYMPHOCYTES NFR BLD AUTO: 19.4 % (ref 21–51)
MCH RBC QN AUTO: 33.8 PG (ref 27–31)
MCV RBC AUTO: 103 FL (ref 78–98)
MONOCYTES # BLD AUTO: 0.7 THOU/UL (ref 0.11–0.59)
MONOCYTES NFR BLD AUTO: 8.6 % (ref 0–10)
NEUTROPHILS # BLD AUTO: 5.7 THOU/UL (ref 1.4–6.5)
NEUTROPHILS NFR BLD AUTO: 69.9 % (ref 42–75)
PLATELET # BLD AUTO: 138 THOU/UL (ref 130–400)
POTASSIUM SERPL-SCNC: 5.2 MMOL/L (ref 3.5–5.1)
RBC # BLD AUTO: 2.27 MILL/UL (ref 4.7–6.1)
SODIUM SERPL-SCNC: 140 MMOL/L (ref 136–145)
UIBC SERPL-MCNC: 166 MCG/DL (ref 261–462)
VIT B12 SERPL-MCNC: 255 PG/ML (ref 211–911)
WBC # BLD AUTO: 8.2 THOU/UL (ref 4.8–10.8)

## 2019-10-05 RX ADMIN — Medication SCH ML: at 22:51

## 2019-10-05 RX ADMIN — FAMOTIDINE SCH MG: 10 INJECTION, SOLUTION INTRAVENOUS at 07:52

## 2019-10-05 RX ADMIN — ALBUMIN HUMAN SCH GM: 250 SOLUTION INTRAVENOUS at 00:04

## 2019-10-05 RX ADMIN — Medication SCH ML: at 07:53

## 2019-10-05 RX ADMIN — ASPIRIN 81 MG CHEWABLE TABLET SCH MG: 81 TABLET CHEWABLE at 07:50

## 2019-10-05 NOTE — PDOC.HOSPP
- Subjective


Encounter Date: 10/05/19


Encounter Time: 12:03


Subjective: 


83 y/o male with multiple comobidities admitted with worsening weakness 

associated with falls. Feeling better but still weak. Oral intake is improving. 

No fever or chest pain. No new problem. Ambulating with PT








- Objective


Vital Signs & Weight: 


 Vital Signs (12 hours)











  Temp Pulse Resp BP Pulse Ox


 


 10/05/19 08:41      100


 


 10/05/19 08:40   64  16  


 


 10/05/19 07:35  97.6 F  65  22 H  132/63  95


 


 10/05/19 03:29  98 F  59 L  15  122/55 L  100








 Weight











Admit Weight                   145 lb 7 oz


 


Weight                         150 lb 14.4 oz














I&O: 


 











 10/04/19 10/05/19 10/06/19





 06:59 06:59 06:59


 


Intake Total 2200 150 


 


Output Total 350 500 


 


Balance 1850 -350 











Result Diagrams: 


 10/05/19 10:33





 10/05/19 10:33





Hospitalist ROS





- Medication


Medications: 


Active Medications











Generic Name Dose Route Start Last Admin





  Trade Name Freq  PRN Reason Stop Dose Admin


 


Albuterol Sulfate  3 mg  09/30/19 16:30  10/03/19 21:49





  Ventolin  NEB   3 mg





  QIDPRN PRN   Administration





  Wheezing   





     





     





     


 


Aspirin  81 mg  10/01/19 09:00  10/05/19 07:50





  Aspirin Chewable  PO   81 mg





  DAILY TERRA   Administration





     





     





     





     


 


Atorvastatin Calcium  40 mg  09/30/19 21:00  10/04/19 20:57





  Lipitor  PO   40 mg





  HS TERRA   Administration





     





     





     





     


 


Carvedilol  1.5625 mg  10/04/19 08:00  10/05/19 07:51





  Coreg  PO   1.5625 mg





  BID-WM TERRA   Administration





     





     





     





     


 


Cholecalciferol  5,000 units  10/01/19 09:00  10/05/19 07:51





  Vitamin D3  PO   5,000 units





  DAILY TERRA   Administration





     





     





     





     


 


Digoxin  0.125 mg  10/01/19 09:00  10/05/19 07:52





  Lanoxin  PO   0.125 mg





  DAILY TERRA   Administration





     





     





     





     


 


Docusate Sodium  100 mg  10/01/19 09:00  10/05/19 07:51





  Colace  PO   100 mg





  DAILY TERRA   Administration





     





     





     





     


 


Dronedarone  400 mg  10/02/19 17:00  10/05/19 07:49





  Multaq  PO   400 mg





  BID-WM TERRA   Administration





     





     





     





     


 


Enoxaparin Sodium  30 mg  10/04/19 09:00  10/05/19 07:49





  Lovenox  SC   30 mg





  0900 TERRA   Administration





     





     





     





     


 


Famotidine  20 mg  10/03/19 09:00  10/05/19 07:52





  Pepcid  SLOW IVP   20 mg





  DAILY TERRA   Administration





     





     





     





     


 


Ferrous Sulfate  325 mg  10/01/19 08:00  10/05/19 07:51





  Feosol  PO   325 mg





  QAM-WM TERRA   Administration





     





     





     





     


 


Folic Acid  2 mg  09/30/19 21:00  10/05/19 07:51





  Folvite  PO   2 mg





  BID TERRA   Administration





     





     





     





     


 


Gabapentin  600 mg  09/30/19 21:00  10/05/19 07:50





  Neurontin  PO   600 mg





  TID TERRA   Administration





     





     





     





     


 


Prednisone  5 mg  10/01/19 08:00  10/05/19 07:52





  Prednisone  PO   5 mg





  QAM-WM TERRA   Administration





     





     





     





     


 


Sodium Chloride  10 ml  10/04/19 21:00  10/05/19 07:53





  Flush - Normal Saline  IVF   10 ml





  Q12HR TERRA   Administration





     





     





     





     


 


Tamsulosin HCl  0.4 mg  09/30/19 21:00  10/04/19 20:57





  Flomax  PO   0.4 mg





  HS TERRA   Administration





     





     





     





     


 


Temazepam  30 mg  09/30/19 21:00  10/04/19 20:58





  Restoril  PO   30 mg





  HS TERRA   Administration





     





     





     





     


 


Tramadol HCl  50 mg  09/30/19 16:30  10/02/19 21:06





  Ultram  PO   50 mg





  BIDPRN PRN   Administration





  Pain   





     





     





     














- Exam


General Appearance: awake alert


Eye: anicteric sclera


ENT: normocephalic atraumatic


Neck: symmetric, no JVD


Heart: RRR


Respiratory: normal chest expansion, rhonchi, wheezes


Gastrointestinal: soft, non-tender, non-distended, normal bowel sounds


Extremities: no edema


Skin - other findings: scattered ecchymosis at vorious stages


Neurological: cranial nerve grossly intact, no focal deficits


Psychiatric: normal affect, A&O x 3





Hosp A/P


(1) GERTRUDE (acute kidney injury)


Code(s): N17.9 - ACUTE KIDNEY FAILURE, UNSPECIFIED   Status: Acute   





(2) Hyperkalemia


Code(s): E87.5 - HYPERKALEMIA   Status: Acute   





(3) Falls frequently


Code(s): R29.6 - REPEATED FALLS   Status: Acute   





(4) Hypotension


Status: Acute   





(5) Demand ischemia


Code(s): I24.8 - OTHER FORMS OF ACUTE ISCHEMIC HEART DISEASE   Status: Acute   





(6) Macrocytic anemia


Code(s): D53.9 - NUTRITIONAL ANEMIA, UNSPECIFIED   Status: Acute   





(7) CAD (coronary artery disease)


Code(s): I25.10 - ATHSCL HEART DISEASE OF NATIVE CORONARY ARTERY W/O ANG PCTRS 

  Status: Chronic   


Qualifiers: 


   Coronary Disease-Associated Artery/Lesion type: native artery   Native vs. 

transplanted heart: native heart   Associated angina: without angina   

Qualified Code(s): I25.10 - Atherosclerotic heart disease of native coronary 

artery without angina pectoris   





(8) COPD (chronic obstructive pulmonary disease)


Status: Chronic   


Qualifiers: 


   COPD type: emphysema   Emphysema type: panlobular   Qualified Code(s): J43.1 

- Panlobular emphysema   





(9) HTN (hypertension)


Code(s): I10 - ESSENTIAL (PRIMARY) HYPERTENSION   Status: Chronic   


Qualifiers: 


   Hypertension type: essential hypertension   Qualified Code(s): I10 - 

Essential (primary) hypertension   





(10) Paroxysmal atrial fibrillation with RVR


Code(s): I48.0 - PAROXYSMAL ATRIAL FIBRILLATION   Status: Chronic   





(11) Physical deconditioning


Code(s): R53.81 - OTHER MALAISE   Status: Acute   





- Plan


DC fluid as FENa and Fe urea is not suggestive of prerenal. ? ATN from 

prolonged hypoperfusion


Wean oxygen as tolerated.


PT/Ot to continue.


Awaiting renal US.


Repeat BMp and CBC in the am.

## 2019-10-05 NOTE — EKG
Test Reason : 

Blood Pressure : ***/*** mmHG

Vent. Rate : 070 BPM     Atrial Rate : 070 BPM

   P-R Int : 158 ms          QRS Dur : 078 ms

    QT Int : 318 ms       P-R-T Axes : 064 024 002 degrees

   QTc Int : 343 ms

 

Normal sinus rhythm

Nonspecific T wave abnormality

Abnormal ECG

 

Confirmed by LAUREN ENCARNACION, SHELLY GILBERT ([id number]),  JORDAN SALINAS ([id number]) on 10/5/2019 10:47:59 AM

 

Referred By:             Confirmed By:SHELLY AGUIALR MD

## 2019-10-05 NOTE — ULT
RENAL ULTRASOUND:

10/5/19

 

HISTORY: 

Acute kidney insufficiency. 

 

Both kidneys show cortical thinning and mild increased cortical echogenicity. Right kidney measures 1
0.4 cm in length. Left kidney measures 11.0. 

 

There is a left renal cyst measuring 5 cm. 

 

Bladder is mildly distended. The prevoid bladder volume is recorded at 42 mL. There is evidence of pr
ostatic hypertrophy.

 

IMPRESSION: 

Cortical thinning and mild increased cortical echogenicity. Large left renal cyst. 

 

POS: OFF

## 2019-10-06 LAB
ALBUMIN SERPL BCG-MCNC: 3.2 G/DL (ref 3.4–4.8)
ANION GAP SERPL CALC-SCNC: 9 MMOL/L (ref 10–20)
BASOPHILS # BLD AUTO: 0 THOU/UL (ref 0–0.2)
BASOPHILS NFR BLD AUTO: 0.3 % (ref 0–1)
BUN SERPL-MCNC: 24 MG/DL (ref 8.4–25.7)
BUN/CREAT SERPL: 9.6
CALCIUM SERPL-MCNC: 8.4 MG/DL (ref 7.8–10.44)
CHLORIDE SERPL-SCNC: 109 MMOL/L (ref 98–107)
CO2 SERPL-SCNC: 27 MMOL/L (ref 23–31)
CREAT CL PREDICTED SERPL C-G-VRATE: 22 ML/MIN (ref 70–130)
EOSINOPHIL # BLD AUTO: 0.2 THOU/UL (ref 0–0.7)
EOSINOPHIL NFR BLD AUTO: 2.4 % (ref 0–10)
GLUCOSE SERPL-MCNC: 123 MG/DL (ref 83–110)
HGB BLD-MCNC: 8.2 G/DL (ref 14–18)
LYMPHOCYTES # BLD: 1.4 THOU/UL (ref 1.2–3.4)
LYMPHOCYTES NFR BLD AUTO: 15.7 % (ref 21–51)
MCH RBC QN AUTO: 32.7 PG (ref 27–31)
MCV RBC AUTO: 107 FL (ref 78–98)
MONOCYTES # BLD AUTO: 0.8 THOU/UL (ref 0.11–0.59)
MONOCYTES NFR BLD AUTO: 8.6 % (ref 0–10)
NEUTROPHILS # BLD AUTO: 6.4 THOU/UL (ref 1.4–6.5)
NEUTROPHILS NFR BLD AUTO: 73.1 % (ref 42–75)
PLATELET # BLD AUTO: 104 THOU/UL (ref 130–400)
POTASSIUM SERPL-SCNC: 5.3 MMOL/L (ref 3.5–5.1)
RBC # BLD AUTO: 2.49 MILL/UL (ref 4.7–6.1)
SODIUM SERPL-SCNC: 140 MMOL/L (ref 136–145)
WBC # BLD AUTO: 8.7 THOU/UL (ref 4.8–10.8)

## 2019-10-06 RX ADMIN — Medication SCH ML: at 19:50

## 2019-10-06 RX ADMIN — Medication SCH ML: at 10:12

## 2019-10-06 RX ADMIN — FAMOTIDINE SCH MG: 10 INJECTION, SOLUTION INTRAVENOUS at 10:11

## 2019-10-06 RX ADMIN — GUAIFENESIN AND DEXTROMETHORPHAN HYDROBROMIDE PRN TAB: 600; 30 TABLET, EXTENDED RELEASE ORAL at 22:44

## 2019-10-06 RX ADMIN — ASPIRIN 81 MG CHEWABLE TABLET SCH MG: 81 TABLET CHEWABLE at 10:10

## 2019-10-06 NOTE — PDOC.HOSPP
- Subjective


Encounter Date: 10/06/19


Encounter Time: 12:21


Subjective: 


85 y/o male with multiple comobidities admitted with worsening weakness 

associated with falls. Feeling better but still weak. Oral intake is improving. 

No fever or chest pain. No new problem. Ambulating with PT





- Objective


Vital Signs & Weight: 


 Vital Signs (12 hours)











  Temp Pulse Resp BP Pulse Ox


 


 10/06/19 09:00   64  16  


 


 10/06/19 07:20  97.7 F  63  20  135/64  97


 


 10/06/19 03:20  97.9 F  65  20  145/64 H  100








 Weight











Admit Weight                   145 lb 7 oz


 


Weight                         150 lb 14.4 oz














I&O: 


 











 10/05/19 10/06/19 10/07/19





 06:59 06:59 06:59


 


Intake Total 150 1470 


 


Output Total 500 1400 


 


Balance -350 70 











Result Diagrams: 


 10/06/19 05:34





 10/06/19 05:34





Hospitalist ROS





- Medication


Medications: 


Active Medications











Generic Name Dose Route Start Last Admin





  Trade Name Freq  PRN Reason Stop Dose Admin


 


Albuterol Sulfate  3 mg  09/30/19 16:30  10/03/19 21:49





  Ventolin  NEB   3 mg





  QIDPRN PRN   Administration





  Wheezing   





     





     





     


 


Albuterol/Ipratropium  3 ml  10/05/19 15:00  10/06/19 11:06





  Duoneb  NEB   Not Given





  QID-RT TERRA   





     





     





     





     


 


Aspirin  81 mg  10/01/19 09:00  10/06/19 10:10





  Aspirin Chewable  PO   81 mg





  DAILY TERRA   Administration





     





     





     





     


 


Atorvastatin Calcium  40 mg  09/30/19 21:00  10/05/19 22:51





  Lipitor  PO   40 mg





  HS TERRA   Administration





     





     





     





     


 


Budesonide  0.5 mg  10/05/19 12:02  10/06/19 09:00





  Pulmicort Neb Solution  INH   0.5 mg





  BID-RT TERRA   Administration





     





     





     





     


 


Carvedilol  1.5625 mg  10/04/19 08:00  10/06/19 07:15





  Coreg  PO   1.5625 mg





  BID-WM TERRA   Administration





     





     





     





     


 


Cholecalciferol  5,000 units  10/01/19 09:00  10/06/19 10:10





  Vitamin D3  PO   5,000 units





  DAILY TERRA   Administration





     





     





     





     


 


Digoxin  0.125 mg  10/01/19 09:00  10/06/19 10:10





  Lanoxin  PO   0.125 mg





  DAILY TERRA   Administration





     





     





     





     


 


Docusate Sodium  100 mg  10/01/19 09:00  10/06/19 10:11





  Colace  PO   100 mg





  DAILY TERRA   Administration





     





     





     





     


 


Dronedarone  400 mg  10/02/19 17:00  10/06/19 07:15





  Multaq  PO   400 mg





  BID-WM TERRA   Administration





     





     





     





     


 


Enoxaparin Sodium  30 mg  10/04/19 09:00  10/06/19 10:11





  Lovenox  SC   30 mg





  0900 TERRA   Administration





     





     





     





     


 


Famotidine  20 mg  10/03/19 09:00  10/06/19 10:11





  Pepcid  SLOW IVP   20 mg





  DAILY TERRA   Administration





     





     





     





     


 


Ferrous Sulfate  325 mg  10/01/19 08:00  10/06/19 07:16





  Feosol  PO   325 mg





  QAM-WM TERRA   Administration





     





     





     





     


 


Folic Acid  2 mg  09/30/19 21:00  10/06/19 10:12





  Folvite  PO   2 mg





  BID TERRA   Administration





     





     





     





     


 


Gabapentin  600 mg  10/06/19 09:00  10/06/19 10:12





  Neurontin  PO   600 mg





  DAILY TERRA   Administration





     





     





     





     


 


Sodium Chloride  10 ml  10/04/19 21:00  10/06/19 10:12





  Flush - Normal Saline  IVF   10 ml





  Q12HR TERRA   Administration





     





     





     





     


 


Tamsulosin HCl  0.4 mg  09/30/19 21:00  10/05/19 22:51





  Flomax  PO   0.4 mg





  HS TERRA   Administration





     





     





     





     


 


Temazepam  30 mg  09/30/19 21:00  10/05/19 22:52





  Restoril  PO   30 mg





  HS TERRA   Administration





     





     





     





     


 


Tramadol HCl  50 mg  09/30/19 16:30  10/02/19 21:06





  Ultram  PO   50 mg





  BIDPRN PRN   Administration





  Pain   





     





     





     














- Exam


General Appearance: awake alert


Eye: anicteric sclera


ENT: normocephalic atraumatic


Neck: symmetric, no JVD


Heart: RRR


Respiratory: no tachypnea


Respiratory - other findings: fair air entry bilaterally with scattered rhonchi


Gastrointestinal: soft, non-tender, non-distended, normal bowel sounds


Extremities: no edema


Extremities - other findings: scattered ecchymosis and bruises at various 

statges of resolution


Neurological: cranial nerve grossly intact, no focal deficits


Neurological - other findings: memory lpases noted


Psychiatric: A&O x 3





Hosp A/P


(1) GERTRUDE (acute kidney injury)


Code(s): N17.9 - ACUTE KIDNEY FAILURE, UNSPECIFIED   Status: Acute   





(2) Hyperkalemia


Code(s): E87.5 - HYPERKALEMIA   Status: Acute   





(3) Falls frequently


Code(s): R29.6 - REPEATED FALLS   Status: Acute   





(4) Hypotension


Status: Acute   





(5) Demand ischemia


Code(s): I24.8 - OTHER FORMS OF ACUTE ISCHEMIC HEART DISEASE   Status: Acute   





(6) Macrocytic anemia


Code(s): D53.9 - NUTRITIONAL ANEMIA, UNSPECIFIED   Status: Acute   





(7) CAD (coronary artery disease)


Code(s): I25.10 - ATHSCL HEART DISEASE OF NATIVE CORONARY ARTERY W/O ANG PCTRS 

  Status: Chronic   


Qualifiers: 


   Coronary Disease-Associated Artery/Lesion type: native artery   Native vs. 

transplanted heart: native heart   Associated angina: without angina   

Qualified Code(s): I25.10 - Atherosclerotic heart disease of native coronary 

artery without angina pectoris   





(8) COPD (chronic obstructive pulmonary disease)


Status: Chronic   


Qualifiers: 


   COPD type: emphysema   Emphysema type: panlobular   Qualified Code(s): J43.1 

- Panlobular emphysema   





(9) HTN (hypertension)


Code(s): I10 - ESSENTIAL (PRIMARY) HYPERTENSION   Status: Chronic   


Qualifiers: 


   Hypertension type: essential hypertension   Qualified Code(s): I10 - 

Essential (primary) hypertension   





(10) Paroxysmal atrial fibrillation with RVR


Code(s): I48.0 - PAROXYSMAL ATRIAL FIBRILLATION   Status: Chronic   





(11) Physical deconditioning


Code(s): R53.81 - OTHER MALAISE   Status: Acute   





(12) CKD (chronic kidney disease) stage 3, GFR 30-59 ml/min


Code(s): N18.3 - CHRONIC KIDNEY DISEASE, STAGE 3 (MODERATE)   Status: Acute   





- Plan


Monitor renal function for recovery. Renal US showed features of CKD without 

obstruction.


Continue bronchodilatos.


Increase prednisone dose


PT/Ot to continue.


Repeat renal function and CBC in the am.


Awaiting placement.


care plan discussed with patient and relatives at bedside

## 2019-10-07 LAB
ALBUMIN SERPL BCG-MCNC: 2.9 G/DL (ref 3.4–4.8)
ANION GAP SERPL CALC-SCNC: 9 MMOL/L (ref 10–20)
BASOPHILS # BLD AUTO: 0.1 THOU/UL (ref 0–0.2)
BASOPHILS NFR BLD AUTO: 0.7 % (ref 0–1)
BUN SERPL-MCNC: 25 MG/DL (ref 8.4–25.7)
BUN/CREAT SERPL: 10.42
CALCIUM SERPL-MCNC: 7.8 MG/DL (ref 7.8–10.44)
CHLORIDE SERPL-SCNC: 107 MMOL/L (ref 98–107)
CO2 SERPL-SCNC: 29 MMOL/L (ref 23–31)
CREAT CL PREDICTED SERPL C-G-VRATE: 23 ML/MIN (ref 70–130)
EOSINOPHIL # BLD AUTO: 0 THOU/UL (ref 0–0.7)
EOSINOPHIL NFR BLD AUTO: 0.4 % (ref 0–10)
GLUCOSE SERPL-MCNC: 133 MG/DL (ref 83–110)
HGB BLD-MCNC: 6.9 G/DL (ref 14–18)
HGB BLD-MCNC: 7.2 G/DL (ref 14–18)
LYMPHOCYTES # BLD: 1.3 THOU/UL (ref 1.2–3.4)
LYMPHOCYTES NFR BLD AUTO: 14.8 % (ref 21–51)
MCH RBC QN AUTO: 34.5 PG (ref 27–31)
MCV RBC AUTO: 103 FL (ref 78–98)
MONOCYTES # BLD AUTO: 0.8 THOU/UL (ref 0.11–0.59)
MONOCYTES NFR BLD AUTO: 8.8 % (ref 0–10)
NEUTROPHILS # BLD AUTO: 6.7 THOU/UL (ref 1.4–6.5)
NEUTROPHILS NFR BLD AUTO: 75.4 % (ref 42–75)
PLATELET # BLD AUTO: 146 THOU/UL (ref 130–400)
PLATELET # BLD AUTO: 155 THOU/UL (ref 130–400)
POTASSIUM SERPL-SCNC: 4.5 MMOL/L (ref 3.5–5.1)
RBC # BLD AUTO: 2.01 MILL/UL (ref 4.7–6.1)
SODIUM SERPL-SCNC: 140 MMOL/L (ref 136–145)
WBC # BLD AUTO: 8.8 THOU/UL (ref 4.8–10.8)

## 2019-10-07 PROCEDURE — 30233N1 TRANSFUSION OF NONAUTOLOGOUS RED BLOOD CELLS INTO PERIPHERAL VEIN, PERCUTANEOUS APPROACH: ICD-10-PCS | Performed by: INTERNAL MEDICINE

## 2019-10-07 RX ADMIN — ALBUMIN HUMAN SCH GM: 250 SOLUTION INTRAVENOUS at 23:55

## 2019-10-07 RX ADMIN — FAMOTIDINE SCH MG: 10 INJECTION, SOLUTION INTRAVENOUS at 08:14

## 2019-10-07 RX ADMIN — ASPIRIN 81 MG CHEWABLE TABLET SCH MG: 81 TABLET CHEWABLE at 08:14

## 2019-10-07 RX ADMIN — ALBUMIN HUMAN SCH GM: 250 SOLUTION INTRAVENOUS at 18:09

## 2019-10-07 RX ADMIN — Medication SCH ML: at 20:18

## 2019-10-07 RX ADMIN — ALBUMIN HUMAN SCH GM: 250 SOLUTION INTRAVENOUS at 12:01

## 2019-10-07 RX ADMIN — GUAIFENESIN AND DEXTROMETHORPHAN HYDROBROMIDE PRN TAB: 600; 30 TABLET, EXTENDED RELEASE ORAL at 20:16

## 2019-10-07 RX ADMIN — Medication SCH ML: at 08:15

## 2019-10-07 NOTE — PDOC.HOSPP
- Subjective


Encounter Date: 10/07/19


Encounter Time: 09:20


Subjective: 





f/u for GERTRUDE/CKD, weakness, falls. States feeling better overall. Appetite ok. 

Worked with PT for ambulating.





- Objective


Vital Signs & Weight: 


 Vital Signs (12 hours)











  Temp Pulse Resp BP Pulse Ox


 


 10/07/19 07:47  97.8 F  69  20  141/86 H  100


 


 10/07/19 03:31  98.4 F  67  15  113/55 L  96








 Weight











Admit Weight                   145 lb 7 oz


 


Weight                         150 lb 14.4 oz














I&O: 


 











 10/06/19 10/07/19 10/08/19





 06:59 06:59 06:59


 


Intake Total 1470 1440 


 


Output Total 1400 1230 


 


Balance 70 210 











Result Diagrams: 


 10/07/19 05:04





 10/07/19 05:04


Additional Labs: 





 Laboratory Tests











  11/12/17 11/12/17 11/13/17





  18:28 18:28 03:44


 


WBC  32.9 H  


 


Hgb  12.4 L  


 


Hct   


 


Plt Count   


 


BUN   


 


Creatinine   0.95  1.46 H


 


Iron   


 


TIBC   


 


% Saturation   


 


Ferritin   


 


Total Bilirubin   


 


AST   


 


ALT   


 


Alkaline Phosphatase   


 


Troponin I   


 


Free T4   


 


TSH 3rd Generation   


 


PTH Intact   


 


Digoxin   














  11/13/17 11/14/17 11/14/17





  03:44 04:53 04:53


 


WBC  18.2 H   20.7 H


 


Hgb  10.6 L   8.6 L


 


Hct   


 


Plt Count   


 


BUN   


 


Creatinine   1.47 H 


 


Iron   


 


TIBC   


 


% Saturation   


 


Ferritin   


 


Total Bilirubin   


 


AST   


 


ALT   


 


Alkaline Phosphatase   


 


Troponin I   


 


Free T4   


 


TSH 3rd Generation   


 


PTH Intact   


 


Digoxin   














  11/15/17 11/15/17 01/09/18





  04:22 04:22 02:15


 


WBC   19.7 H 


 


Hgb   7.1 L 


 


Hct   


 


Plt Count   


 


BUN   


 


Creatinine  1.66 H   0.99


 


Iron   


 


TIBC   


 


% Saturation   


 


Ferritin   


 


Total Bilirubin   


 


AST   


 


ALT   


 


Alkaline Phosphatase   


 


Troponin I   


 


Free T4   


 


TSH 3rd Generation   


 


PTH Intact   


 


Digoxin   














  09/30/19 09/30/19 09/30/19





  00:32 00:32 00:32


 


WBC   


 


Hgb  11.8 L  


 


Hct  36.0 L  


 


Plt Count   


 


BUN   


 


Creatinine    1.88 H


 


Iron   


 


TIBC   


 


% Saturation   


 


Ferritin   


 


Total Bilirubin    1.4 H


 


AST    62 H


 


ALT    102 H


 


Alkaline Phosphatase    124 H


 


Troponin I   0.223 H 


 


Free T4   


 


TSH 3rd Generation   


 


PTH Intact   


 


Digoxin   














  09/30/19 09/30/19 09/30/19





  00:32 03:34 06:31


 


WBC   


 


Hgb   


 


Hct   


 


Plt Count   


 


BUN   


 


Creatinine   


 


Iron   


 


TIBC   


 


% Saturation   


 


Ferritin   


 


Total Bilirubin   


 


AST   


 


ALT   


 


Alkaline Phosphatase   


 


Troponin I   0.210 H  0.201 H


 


Free T4   


 


TSH 3rd Generation  6.0115 H  


 


PTH Intact   


 


Digoxin   














  09/30/19 10/02/19 10/02/19





  06:31 05:19 05:19


 


WBC   


 


Hgb   


 


Hct   


 


Plt Count   


 


BUN   27 H 


 


Creatinine   2.68 H 


 


Iron   


 


TIBC   


 


% Saturation   


 


Ferritin   


 


Total Bilirubin   


 


AST   


 


ALT   


 


Alkaline Phosphatase   


 


Troponin I   


 


Free T4    0.78


 


TSH 3rd Generation   


 


PTH Intact   


 


Digoxin  Less than  0.15 L  














  10/04/19 10/05/19 10/05/19





  05:28 10:33 10:33


 


WBC   


 


Hgb  8.2 L  


 


Hct   


 


Plt Count  135  


 


BUN   


 


Creatinine   2.50 H 


 


Iron   103 


 


TIBC   166 L 


 


% Saturation   62 H 


 


Ferritin    825.09 H


 


Total Bilirubin   


 


AST   


 


ALT   


 


Alkaline Phosphatase   


 


Troponin I   


 


Free T4   


 


TSH 3rd Generation   


 


PTH Intact   


 


Digoxin   














  10/05/19 10/06/19 10/06/19





  10:33 05:34 05:34


 


WBC   


 


Hgb  7.7 L   8.2 L


 


Hct   


 


Plt Count  138   104 L


 


BUN   


 


Creatinine   2.50 H 


 


Iron   


 


TIBC   


 


% Saturation   


 


Ferritin   


 


Total Bilirubin   


 


AST   


 


ALT   


 


Alkaline Phosphatase   


 


Troponin I   


 


Free T4   


 


TSH 3rd Generation   


 


PTH Intact   


 


Digoxin   














  10/06/19





  08:38


 


WBC 


 


Hgb 


 


Hct 


 


Plt Count 


 


BUN 


 


Creatinine 


 


Iron 


 


TIBC 


 


% Saturation 


 


Ferritin 


 


Total Bilirubin 


 


AST 


 


ALT 


 


Alkaline Phosphatase 


 


Troponin I 


 


Free T4 


 


TSH 3rd Generation 


 


PTH Intact  101.3 H


 


Digoxin 











EKG Reviewed by me: Yes (Tele - SR)





Hospitalist ROS





- Medication


Medications: 


Active Medications











Generic Name Dose Route Start Last Admin





  Trade Name Freq  PRN Reason Stop Dose Admin


 


Albuterol Sulfate  3 mg  09/30/19 16:30  10/03/19 21:49





  Ventolin  NEB   3 mg





  QIDPRN PRN   Administration





  Wheezing   





     





     





     


 


Albuterol/Ipratropium  3 ml  10/05/19 15:00  10/07/19 06:51





  Duoneb  NEB   3 ml





  QID-RT TERRA   Administration





     





     





     





     


 


Aspirin  81 mg  10/01/19 09:00  10/07/19 08:14





  Aspirin Chewable  PO   81 mg





  DAILY TERRA   Administration





     





     





     





     


 


Atorvastatin Calcium  40 mg  09/30/19 21:00  10/06/19 19:50





  Lipitor  PO   40 mg





  HS TERRA   Administration





     





     





     





     


 


Budesonide  0.5 mg  10/05/19 12:02  10/07/19 06:53





  Pulmicort Neb Solution  INH   0.5 mg





  BID-RT TERRA   Administration





     





     





     





     


 


Carvedilol  1.5625 mg  10/04/19 08:00  10/07/19 08:18





  Coreg  PO   1.5625 mg





  BID-WM TERRA   Administration





     





     





     





     


 


Cholecalciferol  5,000 units  10/01/19 09:00  10/07/19 08:12





  Vitamin D3  PO   5,000 units





  DAILY TERRA   Administration





     





     





     





     


 


Digoxin  0.125 mg  10/01/19 09:00  10/07/19 08:12





  Lanoxin  PO   0.125 mg





  DAILY TERRA   Administration





     





     





     





     


 


Docusate Sodium  100 mg  10/01/19 09:00  10/07/19 08:14





  Colace  PO   100 mg





  DAILY TERRA   Administration





     





     





     





     


 


Dronedarone  400 mg  10/02/19 17:00  10/07/19 08:13





  Multaq  PO   400 mg





  BID-WM TERRA   Administration





     





     





     





     


 


Enoxaparin Sodium  30 mg  10/04/19 09:00  10/07/19 08:15





  Lovenox  SC   30 mg





  0900 TERRA   Administration





     





     





     





     


 


Famotidine  20 mg  10/03/19 09:00  10/07/19 08:14





  Pepcid  SLOW IVP   20 mg





  DAILY TERRA   Administration





     





     





     





     


 


Ferrous Sulfate  325 mg  10/01/19 08:00  10/07/19 08:14





  Feosol  PO   325 mg





  QAM-WM TERRA   Administration





     





     





     





     


 


Folic Acid  2 mg  09/30/19 21:00  10/07/19 08:13





  Folvite  PO   2 mg





  BID TERRA   Administration





     





     





     





     


 


Gabapentin  600 mg  10/06/19 09:00  10/07/19 08:13





  Neurontin  PO   600 mg





  DAILY TERRA   Administration





     





     





     





     


 


Guaifenesin/Dextromethorphan  1 tab  10/06/19 22:28  10/06/19 22:44





  Mucinex Dm  PO   1 tab





  Q12H PRN   Administration





  Congestion/cough   





     





     





     


 


Prednisone  40 mg  10/07/19 08:00  10/07/19 08:14





  Prednisone  PO   40 mg





  QAM-WM TERRA   Administration





     





     





     





     


 


Sodium Chloride  10 ml  10/04/19 21:00  10/07/19 08:15





  Flush - Normal Saline  IVF   10 ml





  Q12HR TERRA   Administration





     





     





     





     


 


Tamsulosin HCl  0.4 mg  09/30/19 21:00  10/06/19 19:50





  Flomax  PO   0.4 mg





  HS TERRA   Administration





     





     





     





     


 


Temazepam  30 mg  09/30/19 21:00  10/06/19 19:50





  Restoril  PO   30 mg





  HS TERRA   Administration





     





     





     





     


 


Tramadol HCl  50 mg  09/30/19 16:30  10/07/19 08:11





  Ultram  PO   50 mg





  BIDPRN PRN   Administration





  Pain   





     





     





     














- Exam


General Appearance: NAD, awake alert


Eye: PERRL, anicteric sclera


ENT: normocephalic atraumatic, no oropharyngeal lesions


Neck: supple, symmetric, no JVD, no thyromegaly, no lymphadenopathy


Heart: RRR, no murmur, no gallops, no rubs, normal peripheral pulses


Respiratory: CTAB, no wheezes, no rales, no ronchi, normal chest expansion


Gastrointestinal: soft, non-tender, non-distended, normal bowel sounds


Extremities - other findings: multiple areas of ecchymosis/bruising on 

extremities


Skin: normal turgor


Neurological: cranial nerve grossly intact, no new deficit


Musculoskeletal: normal tone, generalized weakness


Psychiatric: normal affect, A&O x 3





Hosp A/P


(1) GERTRUDE (acute kidney injury)


Code(s): N17.9 - ACUTE KIDNEY FAILURE, UNSPECIFIED   Status: Acute   





(2) Macrocytic anemia


Code(s): D53.9 - NUTRITIONAL ANEMIA, UNSPECIFIED   Status: Acute   


Plan: 


Acute/subacute, repeat H/H today, serial monitoring, Folate daily








(3) Hypotension


Status: Acute   


Plan: 


Resolved, continue low-dose Coreg








(4) Paroxysmal atrial fibrillation with RVR


Code(s): I48.0 - PAROXYSMAL ATRIAL FIBRILLATION   Status: Chronic   


Plan: 


Currently in SR, continue Coreg/Multaq








(5) Falls frequently


Code(s): R29.6 - REPEATED FALLS   Status: Acute   


Plan: 


PT for functional assessment, ambulation, pending Rehab approval








(6) COPD (chronic obstructive pulmonary disease)


Status: Chronic   


Qualifiers: 


   COPD type: emphysema   Emphysema type: panlobular   Qualified Code(s): J43.1 

- Panlobular emphysema   


Plan: 


Stable, continue Bronchodilators, Prednisone








(7) Demand ischemia


Code(s): I24.8 - OTHER FORMS OF ACUTE ISCHEMIC HEART DISEASE   Status: Acute   





- Plan


PT/OT, , respiratory therapy, out of bed/ambulate, DVT proph w/

SCDs


Stable currently


Decrease Coreg 1.5625mg BID


Cardiology consultation appreciated


Rehab pending


PT/OT for functional assessment


Lab: Repeat H/H now


OOB with PT

## 2019-10-07 NOTE — CON
DATE OF CONSULTATION:  10/07/2019



SERVICE:  Nephrology.



REQUESTING PHYSICIAN:  Dr. Leif Rubio.



REASON FOR CONSULTATION:  Acute renal failure.



HISTORY OF PRESENT ILLNESS:  An 84-year-old male patient with multiple comorbidities

including coronary artery disease, paroxysmal atrial fibrillation, COPD, on home

oxygen, admitted on September 30, 2019, due to worsening generalized weakness

associated with frequent falls.  The patient was found to be in atrial fibrillation

necessitating Cardiology consult.  The patient also was noticed to have acute

elevation in creatinine.  Prior to hospitalization, the patient reported bilateral

leg swelling for which he was started on diuretics by PCP with some improvement.

Since hospitalization at initial stage, the patient had hypotension, which was felt

to be due to volume depletion related to diuretic therapy, hence was treated with

gentle IV fluid hydration.  However, renal function remained elevated, necessitating

Nephrology consult. 



Of note, baseline creatinine ranges from 0.7 to 0.9, but was 1.88 on presentation on

September 30, but has progressively gone up to a peak of 2.73 before trending down

was to 2.40 today.  The patient denied NSAID use and there has been no nephrotoxic

agent use since hospitalization.  The patient admitted to frequent falls as well as

multiple bruises all over the body, but denied loss of consciousness.  He was unable

to tell me, however, if he was having orthostatic hypotension and dizziness leading

to the falls. 



The patient also denied dysuria, hematuria, difficulty urinating, though he admitted

to BPH.  He also denied nausea, vomiting, or change in bowel habit. 



PAST MEDICAL HISTORY:  

1. Chronic diastolic heart failure.

2. Paroxysmal atrial fibrillation.

3. Chronic respiratory failure, on home oxygen.

4. Advanced COPD.

5. Peripheral neuropathy.

6. Coronary artery disease status post MI.

7. Carotid atherosclerosis status post endarterectomy.

8. Hypertension.



PAST SURGICAL HISTORY:  

1. Left carotid endarterectomy.

2. Right hip surgery.

3. Appendectomy.

4. Right lung surgery.



FAMILY HISTORY:  Reviewed, but noncontributory.



SOCIAL HISTORY:  The patient lives at home with family.  He is a former smoker and

also chewed tobacco.  Quit more than 10 years ago.  Denied alcohol or recreational

drug use.  He currently chews tobacco. 



ALLERGIES:  NO KNOWN DRUG ALLERGIES REPORTED.



MEDICATIONS:  Prior to hospital medications.

1. Albuterol sulfate two puffs inhalation q.i.d.

2. Aspirin 81 mg p.o. daily.

3. Lipitor 40 mg p.o. daily at bedtime.

4. Budesonide 0.25 b.i.d.

5. Carvedilol 3.125 mg p.o. b.i.d.

6. Cholecalciferol 5000 units p.o. daily.

7. Digoxin 125 mcg p.o. daily.

8. Docusate sodium 100 mg p.o. daily.

9. Ferrous sulfate 325 mg p.o. daily.

10. Folic acid 2 mg p.o. b.i.d.

11. Gabapentin 600 mg p.o. t.i.d.

12. DuoNeb 3 mL nebulization t.i.d.

13. Sennosides-docusate two tablets p.o. b.i.d. p.r.n. for constipation.

14. __________ p.o. daily at bedtime p.r.n.

15. Temazepam 30 mg p.o. daily at bedtime.

16. Tramadol 50 mg p.o. b.i.d. p.r.n.

17. Prednisone 10 mg p.o. daily.

18. Flomax 0.4 mg p.o. daily at bedtime.



CURRENT HOSPITAL MEDICATIONS:  

1. Aspirin 81 mg p.o. daily.

2. Lipitor 40 mg p.o. daily at bedtime.

3. Budesonide 0.5 mg b.i.d.

4. Carvedilol 1.5625 mg p.o. b.i.d.

5. Cholecalciferol 5000 units p.o. daily.

6. Digoxin 125 mcg p.o. daily.

7. Docusate 100 mg p.o. daily.

8. Multaq 400 mg p.o. b.i.d.

9. Lovenox 30 mg subcutaneously daily.

10. Pepcid 20 mg daily.

11. Ferrous sulfate 325 mg p.o. daily.

12. Folic acid 2 mg p.o. b.i.d.

13. Gabapentin 600 mg p.o. daily.

14. DuoNeb 3 mL q.i.d.

15. Prednisone 40 mg p.o. daily.

16. Flomax 0.4 mg p.o. daily at bedtime.

17. Restoril 30 mg p.o. daily at bedtime.

18. Guanfacine DM one tablet p.o. b.i.d. p.r.n. for cough.

19. Albuterol sulfate nebulization q.i.d. p.r.n.

20. Tramadol 50 mg p.o. b.i.d. p.r.n. for pain.



REVIEW OF SYSTEMS:  Patient reports pleuritic right lower chest pain which he thinks

may be related to prior shingles.  This pain is said to be worse with coughing. 



A 12-point review of system performed was negative other than pertinent positives

and negatives included in the history of present illness. 



PHYSICAL EXAMINATION:

VITAL SIGNS:  Temperature 97.8, pulse 69, respiratory rate 20, SpO2 of 100% on 2 L

nasal cannula, blood pressure is 141/86. 

GENERAL:  Elderly male, in no obvious distress.  Afebrile.  Anicteric.  Acyanotic. 

HEENT:  Normocephalic, atraumatic.  Oral mucosa is moist. 

NECK:  Supple with no obvious mass or lymphadenopathy or JVD appreciated. 

CARDIOVASCULAR:  Regular rhythm and rate with normal heart sounds 1 and 2.  Soft

systolic murmur is noted. 

RESPIRATORY:  Fair air entry bilaterally with prolonged expiration and scattered few

rhonchi.  There is no use of accessory muscles. 

GI:  Full, soft, nontender, nondistended with normal bowel sounds. 

EXTREMITIES:  No edema or erythema.  Scattered bruises all over the body at various

stages of resolution noted. 

CNS:  Conscious and alert and oriented x3 with appropriate mental status.  Memory

lapse is noted.  The patient moves all extremities.  Cranial nerves 2 through 12 are

grossly intact. 



DIAGNOSTIC DATA:  

1. Renal function panel today showed sodium 140, potassium 4.5, chloride 107, CO2 of

29, BUN 25, creatinine 2.40, eGFR of 26, glucose 133, calcium 7.8, phosphorus 2.4,

albumin 2.9.  Of note, on presentation on September 30, creatinine was 1.88. 

2. Potassium yesterday was 5.3.

3. CBC today showed WBC 8.8, hemoglobin 6.9, , platelet 146.

4. On presentation to the hospital, hemoglobin was 11.8.

5. Urinalysis on September 30 showed yellow turbid urine with pH of 6.0, specific

gravity of 1.023.  Urine protein of 300 mg/dL, glucose 100 mg/dL, trace ketones, 2+

blood and 3+ urobilinogen with 25 leukocyte esterase.  Nitrite, bilirubin were

negative.  Microscopy showed 11 to 20 RBC and 7 to 10 WBC with 4 to 6 squamous cells

and 11 to 20 granular cast. 

6. Urine electrolytes on October 4 showed random total protein of 14 mg/dL, urine

creatinine of 29.9, urine sodium of 89 and urine urea nitrogen of 171. 

7. Chest x-ray on presentation, September 30, showed small right pleural effusion

with normal sized cardiomediastinal silhouette.  Multiple stable bilateral rib

fractures were noted. 

8. Echocardiogram performed on October 1 showed preserved systolic function with EF

of 55% to 60% as well as severely thickened trileaflet aortic valve with decreased

excursion and mild-to-moderate aortic stenosis as well as mild mitral regurgitation. 

9. Renal ultrasound performed on October 5 showed cortical thickening and mildly

increased cortical echogenicity of both kidneys with right measuring 10.4 and left

11.  Also noted was large left renal cyst measuring 5 cm. 



ASSESSMENT:  

1. Acute renal failure superimposed on chronic kidney disease stage 3.  This is felt

to be volume mediated with possible acute tubular necrosis given intermittent

hypotension noticed initially on hospitalization.  The patient is currently

euvolemic and continues to make urine.  Fractional excretion of urea and sodium were

not suggestive of prerenal etiology, however.  The patient also has been treated

with crystalloid and colloid with no significant improvement in renal function,

making ATN more likely.  Further questioning revealed that the patient was on

aggressive diuretic therapy prior to hospitalization.  He is currently not on any

nephrotoxic agents. 

2. Hyperkalemia:  Due to acute renal failure, improved following treatment with

Kayexalate. 

3. Acute on chronic anemia:  Etiology is unclear, but anemia of kidney disease as

well as acute blood loss given multiple bruises may be responsible.  Occult GI

bleeding cannot be ruled out as the patient is on aspirin therapy. 

4. Vitamin D deficiency, on supplementation.

5. Hypoalbuminemia.

6. Hypertension, patient had hypotension on presentation, but that has resolved

currently. 

7. Paroxysmal atrial fibrillation:  Currently in sinus with treatment of Multaq.

8. Advanced chronic obstructive pulmonary disease, on home oxygen.

9. Left lower pleuritic chest pain:  May be related to rib fractures.  The patient

was noted to have multiple rib fractures on chest x-ray ordered on presentation. 



PLAN:  

1. We will do therapeutic trial with albumin given the patient is hypoalbuminemic

and looks, maybe on the dry side. 

2. The patient may also benefit from blood transfusion if repeat hemoglobin is

around 7.  This will improve perfusion to the kidneys which may be having some

ischemic changes. 

3. We will start lidocaine patch to the left lower chest to help with pleuritic

chest pain. 

4. We will repeat renal function in the morning.

5. Avoid nephrotoxic agents.

6. Renally dosed on medication.

7. We will follow along with you. 

Many thanks for involving us in the care of this patient.







Job ID:  [id number]

## 2019-10-08 VITALS — SYSTOLIC BLOOD PRESSURE: 166 MMHG | TEMPERATURE: 98.2 F | DIASTOLIC BLOOD PRESSURE: 74 MMHG

## 2019-10-08 LAB
ALBUMIN SERPL BCG-MCNC: 3.7 G/DL (ref 3.4–4.8)
ANION GAP SERPL CALC-SCNC: 7 MMOL/L (ref 10–20)
BASOPHILS # BLD AUTO: 0 THOU/UL (ref 0–0.2)
BASOPHILS NFR BLD AUTO: 0 % (ref 0–1)
BUN SERPL-MCNC: 23 MG/DL (ref 8.4–25.7)
BUN/CREAT SERPL: 11.68
CALCIUM SERPL-MCNC: 8.2 MG/DL (ref 7.8–10.44)
CHLORIDE SERPL-SCNC: 105 MMOL/L (ref 98–107)
CO2 SERPL-SCNC: 31 MMOL/L (ref 23–31)
CREAT CL PREDICTED SERPL C-G-VRATE: 28 ML/MIN (ref 70–130)
EOSINOPHIL # BLD AUTO: 0.1 THOU/UL (ref 0–0.7)
EOSINOPHIL NFR BLD AUTO: 0.7 % (ref 0–10)
GLUCOSE SERPL-MCNC: 85 MG/DL (ref 83–110)
HGB BLD-MCNC: 7.4 G/DL (ref 14–18)
LYMPHOCYTES # BLD: 1.2 THOU/UL (ref 1.2–3.4)
LYMPHOCYTES NFR BLD AUTO: 13.7 % (ref 21–51)
MCH RBC QN AUTO: 33.7 PG (ref 27–31)
MCV RBC AUTO: 101 FL (ref 78–98)
MONOCYTES # BLD AUTO: 0.7 THOU/UL (ref 0.11–0.59)
MONOCYTES NFR BLD AUTO: 7.8 % (ref 0–10)
NEUTROPHILS # BLD AUTO: 6.6 THOU/UL (ref 1.4–6.5)
NEUTROPHILS NFR BLD AUTO: 77.7 % (ref 42–75)
PLATELET # BLD AUTO: 161 THOU/UL (ref 130–400)
POTASSIUM SERPL-SCNC: 4.2 MMOL/L (ref 3.5–5.1)
RBC # BLD AUTO: 2.19 MILL/UL (ref 4.7–6.1)
SODIUM SERPL-SCNC: 139 MMOL/L (ref 136–145)
WBC # BLD AUTO: 8.5 THOU/UL (ref 4.8–10.8)

## 2019-10-08 RX ADMIN — FAMOTIDINE SCH MG: 10 INJECTION, SOLUTION INTRAVENOUS at 08:28

## 2019-10-08 RX ADMIN — Medication SCH ML: at 08:34

## 2019-10-08 RX ADMIN — ASPIRIN 81 MG CHEWABLE TABLET SCH MG: 81 TABLET CHEWABLE at 08:26

## 2019-10-08 NOTE — RAD
EXAM:

Chest PA and lateral:



HISTORY:

Left pleuritic chest pain. Wheezing. 



COMPARISON:

9/30/2019, 10/2/2018



FINDINGS:

Stable diminished lung volume in the right hemithorax. Chronic changes in the right lung base and rig
ht lung apex are noted. Stable rightward deviation cardiac mediastinal silhouette. Mild

compensatory hyperinflation of the left lung. No pneumothorax or acute osseous abnormalities. Old rig
ht rib fractures are noted.

IMPRESSION:

No acute cardiopulmonary process. No significant interval change.







Reported By: Archana Gee 

Electronically Signed:  10/8/2019 9:37 AM

## 2019-10-08 NOTE — PRG
DATE OF SERVICE:  10/08/2019



SERVICE:  Nephrology.



SUBJECTIVE:  An 84-year-old male admitted due to generalized weakness and frequent

fall, being followed up by Nephrology for acute renal failure.  The patient

continues to complain of left lower chest pleuritic chest pain.  Denied nausea,

vomiting, or change in bowel habit.  Oral intake is adequate. 



OBJECTIVE:  VITAL SIGNS:  Temperature 97.5, pulse 58, respiratory rate 15, SpO2 of

94% on 2 L nasal cannula, and blood pressure is 131/62. 

GENERAL:  Elderly male, in no obvious distress.  Afebrile.  Anicteric.  Acyanotic. 

HEENT:  Normocephalic and atraumatic.  Oral mucosa is moist. 

NECK:  Supple with no JVD. 

CARDIOVASCULAR:  Normal heart sounds one and two with systolic murmur. 

RESPIRATORY:  Fair air entry bilaterally with scattered rhonchi and some transmitted

breath sounds.  Work of breathing has not increased. 

GI:  Full, soft, nontender, and nondistended with normal bowel sounds. 

MUSCULOSKELETAL:  Extremities are grossly normal looking without edema.  Scattered

ecchymoses and bruises at different levels of resolution noted. 

CNS:  Conscious, alert, and oriented x3 with appropriate mental status.  Cranial

nerves 2 through 12 are grossly intact.  Memory lapse is noted.  The patient moves

all extremities. 



DIAGNOSTIC DATA:  CBC showed WBC count of 8.5, hemoglobin of 7.4, MCV of 101, and

platelet of 161. 



Renal function panel showed sodium 139, potassium 4.2, chloride 105, CO2 of 31, BUN

23, creatinine 1.97, glucose 85, calcium 8.2, phosphorus 2.6, and albumin 3.7. 



ASSESSMENT:  

1. Acute renal failure:  Most likely due to prerenal etiology given anemia and

volume depletion.  Creatinine is trending down following blood transfusion and

albumin therapy.  Hemodynamics have improved.  Note, the patient had some

hypotensive episodes prior to presentation and during early part of hospitalization.

 He was on diuretic therapy, which I held at this time. 

2. Volume status:  Clinically euvolemic or mildly dry.  Not on diuretic at this time.

3. Pleuritic chest pain:  The patient also has wheezing.  This is concerning for

fluid overload.  Most likely will be due to chronic obstructive pulmonary disease

exacerbation.  JVD or edema was not appreciated. 

4. Anemia:  Acute on chronic.  Most likely due to blood loss anemia superimposed on

chronic anemia.  Iron chemistry was not suggestive of iron deficiency anemia.  The

patient is status post transfusion of one packed red blood cells yesterday. 

5. Hypertension:  Control is acceptable.



PLAN:  [place] oral intake recommended.  No indication for further crystalloid or

colloid.  We will monitor renal function.  Also monitor hemoglobin and transfuse as

needed.  Transfusion as per primary attending.  The patient can be discharged from

Nephrology point of view with close followup. 



Many thanks for involving us in the care of this patient.  We will follow along with

you. 







Job ID:  [id number]

## 2019-10-09 NOTE — DIS
DATE OF ADMISSION:  09/30/2019



DATE OF DISCHARGE:  10/08/2019



DISCHARGE DIAGNOSES:  

1. Acute kidney injury on chronic kidney disease stage 3, improved.

2. Macrocytic anemia, acute/subacute, status post 1 unit of packed red blood cells,

multifactorial. 

3. Hypotension secondary to volume depletion, improved.

4. Paroxysmal atrial fibrillation with rapid ventricular response, currently in

sinus mechanism. 

5. Multiple falls.

6. Chronic obstructive pulmonary disease without exacerbation.  Stable.

7. Demand ischemia.

8. Deconditioning.



CONSULTATIONS:  

1. Dr. Oconnor with Nephrology Service.

2. Dr. Haque with Cardiology Service.



PERTINENT LABORATORY AND X-RAY FINDINGS:  Potassium ranged between 3.5 to 5.3.

Creatinine ranged between 1.88 to 2.73.  Estimated GFR ranged between 22 to 34.

Serum iron level 103, TIBC 166, percent saturation 62, ferritin 825, folate level

76, free T4 level 0.78, TSH 6.01, , albumin level 3.9.  CBC showed a

hemoglobin ranging between 7.2 to 11.8.  MCV ranged between 101 to 105, platelet

count ranged between 104 to 186.  Urine culture dated 09/30/2019, showed less than

10,000 colonies of mixed skin simeon.  Portable chest x-ray dated 09/30/2019, showed

a small right pleural effusion.  CT of the brain without contrast dated 09/30/2019,

showed no acute intracranial process.  Three views of the left shoulder dated

10/01/2019, showed no evidence of acute fracture or dislocation.  2D transthoracic

echocardiogram dated 10/01/2019, showed ejection fraction of 55% to 60%.  Mild to

moderate aortic stenosis.  Mild mitral regurgitation.  Bilateral renal ultrasound

dated 10/05/2019, showed large left renal cyst.  Cortical thinning. 



HOSPITAL COURSE:  The patient was initially admitted to the telemetry unit after

presenting with generalized weakness, multiple falls.  The patient underwent initial

evaluation and noted with acute kidney injury in the context of chronic kidney

disease.  The patient was placed on IV fluids and renally dosed on his chronic

medications.  The patient was also noted with paroxysmal atrial fibrillation with

rapid ventricular response, evaluated by the Cardiology Service.  The patient was

placed on Multaq and continued on Coreg with return to sinus mechanism.  The patient

was also noted with hypotension requiring IV fluid resuscitation and modification to

his Coreg dose to current level of 1.5625 mg b.i.d.  The patient continued to

receive general supportive management including a physical therapy evaluation due to

multiple falls and deconditioning.  Due to the patient's comorbid status, multiple

falls and social situation, the patient was deemed an appropriate candidate for

ongoing skilled care and acute inpatient rehabilitation.  The patient was also

evaluated by the Nephrology Service due to acute kidney injury slowly improved with

IV fluid hydration and renally dosed medications.  The patient did receive albumin

infusions as well as 1 unit of packed red blood cells with overall improved

creatinine prior to discharge.  Likely the patient with component of acute tubular

necrosis due to volume depletion at the time of admission.  Overall, the patient did

remain clinically stable during the hospital course, tolerating regular oral intake.

 Vital signs have remained stable and the patient voiding appropriately.  I have

examined the patient at the time of discharge and discussed followup instructions.

The patient verbalized understanding and in agreement, ready for discharge to

inpatient rehabilitation on 10/08/2019. 



DISCHARGE MEDICATIONS:  

1. Albuterol sulfate nebulized solution 2 puffs nebulized q.i.d.  

2. Aspirin 81 mg p.o. daily.

3. Lipitor 40 mg p.o. at bedtime.

4. Pulmicort 2 puffs inhaled b.i.d.

5. Vitamin D3 5000 units p.o. daily.

6. Digoxin 125 mcg p.o. daily.

7. Docusate sodium 100 mg p.o. daily.

8. Ferrous sulfate 325 mg p.o. daily.

9. Folic acid 2 mg p.o. b.i.d.

10. Gabapentin 600 mg p.o. t.i.d.

11. DuoNeb 3 mL nebulized t.i.d. p.r.n.

12. Senokot-S 2 tablets p.o. b.i.d. p.r.n.

13. Temazepam 15 mg p.o. at bedtime.

14. Tramadol 50 mg p.o. b.i.d. p.r.n. pain.

15. Coreg 1.5625 mg p.o. b.i.d.

16. Multaq 400 mg p.o. b.i.d.

17. Lidoderm 5% patch one patch transdermally daily.

18. Prednisone 40 mg p.o. daily x3 days, followed by 20 mg p.o. daily x3 days,

followed by 10 mg p.o. daily. 

19. Flomax 0.4 mg p.o. at bedtime.



FOLLOWUP:  The patient may follow up with his primary care provider, Dr. Toro Orr after discharge from inpatient rehabilitation.  The patient may follow up

with Dr. Oconnor with Nephrology Service and to call his office for appointment time and

date. 



CONDITION ON DISCHARGE:  Fair.



ACTIVITY:  Ad jayna.  Rolling walker with contact guard assistance with fall risk

precautions. 



DIET:  Regular.



CODE STATUS:  Full.



DISPOSITION:  Discharged to [facility] on 10/08/2019.



TIME SPENT:  Total time preparing and coordinating discharge, 45 minutes.







Job ID:  [id number]

## 2019-10-10 NOTE — PQF
HOLLEY SANTACRUZ CHARLES DO

[id number]                                                             [facility]

[id number]                             

                                   

CLINICAL DOCUMENTATION CLARIFICATION FORM:  POST DISCHARGE



Addendum to original discharge summary date:  __________________________________
____



Late entry note date:  _________________________________________________________
__











DATE:10-                                       ATTN:Leif Mari



Please exercise your independent, professional judgment in responding to the 
clarification form. 

Clinical indicators are provided on the bottom of this form for your review





Please check appropriate box(s):



[  ] Hypovolemic Shock     

[  ] Cardiogenic Shock      

[  ] Shock unspecified  

[ x ] Other diagnosis please specify: _Hypotension without shock__________

[  ] Unable to determine



In addition, please specify:

Present on Admission (POA):  [  ] Yes             [ x ] No             [  ] 
Unable to determine



For continuity of documentation, please document condition throughout progress 
notes and discharge summary.  Thank You.





CLINICAL INDICATORS:

Hospitalist PN p1 10/1Code green called regarding hypotension

Hospitalist PN p1 10/1Started IVF bolus with NS and telemetry showing A-fib 
with RVR in low 100's

Hospitalist PN p1 10/1BP 78/39

Hospitalist PN p8 10/2Demand ischemia likely NSTEMI type II



RISK FACTORS:

Hospitalist PN p6 10/1-Hypotension

Hospitalist PN p6 10/1-Paroxysmal atrial fibrillation with RVR

Hospitalist PN p6 10/1-GERTRUDE

Hospitalist PN p6 10/1-Volume depletion





TREATMENTS:



Hospitalist PN p7 10/1-Trendelenberg position

Hospitalist PN p7 10/1-IVF bolus with NS x 1L

Hospitalist PN p7 10/1-Hospitalist PN p7 10/1

Cardiology Consultaion-Dr. Haque consult 10/1







(This form is maintained as a part of the permanent medical record)

2015 [employer].  All Rights Reserved

Judie [email or web address]    [not provided]

                                                              



MTDD

## 2019-10-10 NOTE — PQF
HOLLEY SANTACRUZ CHARLES DO

[id number]                                                             [facility]-279

[id number]                             

                                   

CLINICAL DOCUMENTATION CLARIFICATION FORM:  POST DISCHARGE



Addendum to original discharge summary date:  __________________________________
____



Late entry note date:  _________________________________________________________
__











DATE: 10-                                                 ATTN:Leif Mari





Please exercise your independent, professional judgment in responding to the 
clarification form. 

Clinical indicators are provided on the bottom of this form for your review



Can you please specify whether Type II MI is ruled in or ruled out during this 
encounter?



Please check appropriate box(s) to clarify if the following diagnosis has been 
ruled in or ruled out:



Type II MI



[ x ] Ruled in diagnosis

    [  ] Continue to treat       [ x ] Resolved

[  ] Ruled out diagnosis

[  ] Cannot rule out diagnosis

[  ] Other diagnosis please specify: ___________

[  ] Unable to determine







For continuity of documentation, please document condition throughout progress 
notes and discharge summary.  Thank You.



CLINICAL INDICATORS:

HP 9/30 pg1 Dr. Jc 

PN 10/1 pg3 Dr. Ramiro calix Ischemia

Consult 10/1 pg1 Dr. Haque Elevated troponin likely type II MI

PN 10/2 pg2 Dr. Rubio Demand ischemia liklely NSTEMI type II 

DS 10/8 pg1 Dr. Ramiro Calix Ischemia





RISK FACTOR: 

HP 9/30  Old MI 15 years ago

HP 9/30 -CHF

HP 9/30 -CAD

HP 9/30-HTN

HP 9/30- AFIB

HP 9/30-Former Tobacco user





TREATMENTS:

PN 10/1 Dr. Rubio- Cardio Consult Dr. Haque

PN 10/1 Dr. Rubio- Trendelenburg position

PN 10/2 pg2 Dr. Rubio- Aspirin







(This form is maintained as a part of the permanent medical record)

2015 [employer].  All Rights Reserved

Judie [email or web address]    [not provided]

                                                              



 

MTDD

## 2019-10-13 NOTE — PQF
HOLLEY SANTACRUZ CHARLES DO

[id number]                                                             [facility]279

[id number]                             

                                   

CLINICAL DOCUMENTATION CLARIFICATION FORM:  POST DISCHARGE



Addendum to original discharge summary date:  __________________________________
____



Late entry note date:  _________________________________________________________
__











DATE:10-                                                       ATTN:Leif Mari



Please exercise your independent, professional judgment in responding to the 
clarification form. 

Clinical indicators are provided on the bottom of this form for your review



Diagnosis: Type II MI



Present on Admission (POA):  [ x ] Yes             [  ] No             [  ] 
Unable to determine



 

Coding guidelines require hospitals to identify whether a diagnosis was present 
on admission (POA) or not. To accurately assign the appropriate POA indicator, 
this information must be clearly documented within the medical record. 



CLINICAL INDICATORS:

PN 10/1 pg3 Dr. Rubio demand Ischemia

Consult 10/1 pg1 Dr. Haque Elevated troponin likely type II MI

PN 10/2 pg2 Dr. Rubio Demand ischemia liklely NSTEMI type II 

DS 10/8 pg1 Dr. Rubio Demand Ischemia





RISK FACTOR: 

HP 9/30  Old MI 15 years ago

HP 9/30 -CHF

HP 9/30 -CAD

HP 9/30-HTN

HP 9/30- AFIB

HP 9/30-Former Tobacco user



TREATMENTS:

PN 10/1 Dr. Rubio- Cardio Consult Dr. Haque

PN 10/1 Dr. Rubio- Trendelenburg position

PN 10/2 pg2 Dr. Rubio- Aspirin











(This form is maintained as a part of the permanent medical record)

[employer].  All Rights Reserved

Judie [email or web address]    [not provided]

                                                              



MTDD